# Patient Record
Sex: MALE | Race: BLACK OR AFRICAN AMERICAN | NOT HISPANIC OR LATINO | Employment: FULL TIME | URBAN - METROPOLITAN AREA
[De-identification: names, ages, dates, MRNs, and addresses within clinical notes are randomized per-mention and may not be internally consistent; named-entity substitution may affect disease eponyms.]

---

## 2017-01-18 ENCOUNTER — ALLSCRIPTS OFFICE VISIT (OUTPATIENT)
Dept: OTHER | Facility: OTHER | Age: 47
End: 2017-01-18

## 2018-01-13 VITALS
TEMPERATURE: 98.2 F | WEIGHT: 171.38 LBS | HEART RATE: 78 BPM | RESPIRATION RATE: 14 BRPM | DIASTOLIC BLOOD PRESSURE: 72 MMHG | SYSTOLIC BLOOD PRESSURE: 124 MMHG | HEIGHT: 69 IN | BODY MASS INDEX: 25.38 KG/M2

## 2018-01-13 NOTE — MISCELLANEOUS
Provider Comments  Provider Comments:   Patient was a no show for today's scheduled appointment at 3:45 PM for hiccups        Signatures   Electronically signed by : Magali Rinne, M D ; Apr  7 [id number]  5:16PM EST                       (Author)

## 2018-02-12 DIAGNOSIS — L30.9 DERMATITIS: Primary | ICD-10-CM

## 2018-02-12 RX ORDER — PREDNISONE 20 MG/1
TABLET ORAL
Qty: 11 TABLET | Refills: 0 | Status: SHIPPED | OUTPATIENT
Start: 2018-02-12 | End: 2019-09-10 | Stop reason: SDUPTHER

## 2018-02-19 DIAGNOSIS — L30.9 DERMATITIS: ICD-10-CM

## 2018-03-16 RX ORDER — PREDNISONE 20 MG/1
TABLET ORAL
Qty: 11 TABLET | Refills: 0 | OUTPATIENT
Start: 2018-03-16

## 2019-09-09 RX ORDER — ALBUTEROL SULFATE 90 UG/1
2 AEROSOL, METERED RESPIRATORY (INHALATION)
COMMUNITY
Start: 2013-07-17 | End: 2020-12-07

## 2019-09-09 RX ORDER — ALBUTEROL SULFATE 2.5 MG/3ML
2.5 SOLUTION RESPIRATORY (INHALATION) EVERY 4 HOURS PRN
COMMUNITY
Start: 2019-09-08 | End: 2020-09-07

## 2019-09-10 ENCOUNTER — OFFICE VISIT (OUTPATIENT)
Dept: FAMILY MEDICINE CLINIC | Facility: CLINIC | Age: 49
End: 2019-09-10
Payer: COMMERCIAL

## 2019-09-10 VITALS
OXYGEN SATURATION: 98 % | BODY MASS INDEX: 24.74 KG/M2 | TEMPERATURE: 97.4 F | DIASTOLIC BLOOD PRESSURE: 74 MMHG | HEART RATE: 61 BPM | SYSTOLIC BLOOD PRESSURE: 120 MMHG | WEIGHT: 172.8 LBS | RESPIRATION RATE: 18 BRPM | HEIGHT: 70 IN

## 2019-09-10 DIAGNOSIS — J45.909 UNCOMPLICATED ASTHMA, UNSPECIFIED ASTHMA SEVERITY, UNSPECIFIED WHETHER PERSISTENT: ICD-10-CM

## 2019-09-10 DIAGNOSIS — N52.9 ERECTILE DYSFUNCTION, UNSPECIFIED ERECTILE DYSFUNCTION TYPE: Primary | ICD-10-CM

## 2019-09-10 DIAGNOSIS — L30.9 DERMATITIS: ICD-10-CM

## 2019-09-10 PROCEDURE — 99214 OFFICE O/P EST MOD 30 MIN: CPT | Performed by: FAMILY MEDICINE

## 2019-09-10 PROCEDURE — 3008F BODY MASS INDEX DOCD: CPT | Performed by: FAMILY MEDICINE

## 2019-09-10 RX ORDER — PREDNISONE 20 MG/1
TABLET ORAL
Qty: 11 TABLET | Refills: 3 | Status: SHIPPED | OUTPATIENT
Start: 2019-09-10 | End: 2020-08-18 | Stop reason: ALTCHOICE

## 2019-09-10 RX ORDER — FLUTICASONE FUROATE AND VILANTEROL 200; 25 UG/1; UG/1
1 POWDER RESPIRATORY (INHALATION) DAILY
Qty: 1 INHALER | Refills: 5 | Status: SHIPPED | OUTPATIENT
Start: 2019-09-10 | End: 2021-02-07

## 2019-09-10 RX ORDER — ALBUTEROL SULFATE 90 UG/1
2 AEROSOL, METERED RESPIRATORY (INHALATION) EVERY 6 HOURS PRN
Qty: 1 INHALER | Refills: 3 | Status: SHIPPED | OUTPATIENT
Start: 2019-09-10 | End: 2020-08-18 | Stop reason: SDUPTHER

## 2019-09-10 RX ORDER — SILDENAFIL CITRATE 20 MG/1
20 TABLET ORAL 3 TIMES DAILY
Qty: 30 TABLET | Refills: 5 | Status: SHIPPED | OUTPATIENT
Start: 2019-09-10

## 2019-09-10 NOTE — ASSESSMENT & PLAN NOTE
Dermatitis  Patient given prescription for prednisone tapering dose as ordered which she has used in the past for outbreaks of dermatitis in pruritic scalp    Topical products have not been effective according to patient

## 2019-09-10 NOTE — PROGRESS NOTES
FAMILY PRACTICE OFFICE VISIT       NAME: Tiffany Victor  AGE: 51 y o  SEX: male       : 1970        MRN: [id number]    DATE: 9/10/2019  TIME: 6:35 PM    Assessment and Plan     Problem List Items Addressed This Visit        Respiratory    Uncomplicated asthma     Asthma  Patient was given prescription to initiate Breo Ellipta inhaler 200 mcg dose once daily  He was also given of Ventolin HFA inhaler for rescue situations where he may use 2 puffs q i d  P r n  Michael Koo Patient will call if symptoms persist whereby we may consider pulmonologist consultation         Relevant Medications    albuterol (2 5 mg/3 mL) 0 083 % nebulizer solution    albuterol (VENTOLIN HFA) 90 mcg/act inhaler    fluticasone-salmeterol (ADVAIR DISKUS) 250-50 mcg/dose inhaler    fluticasone-vilanterol (BREO ELLIPTA) 200-25 MCG/INH inhaler    albuterol (VENTOLIN HFA) 90 mcg/act inhaler       Musculoskeletal and Integument    Dermatitis     Dermatitis  Patient given prescription for prednisone tapering dose as ordered which she has used in the past for outbreaks of dermatitis in pruritic scalp  Topical products have not been effective according to patient         Relevant Medications    predniSONE 20 mg tablet    Other Relevant Orders    Comprehensive metabolic panel    TSH, 3rd generation    Testosterone       Genitourinary    Erectile dysfunction - Primary     Erectile dysfunction  Patient was given prescription to obtain blood work as ordered for further evaluation  He may also try sildenafil 20 mg tablets and may use 1-5 tablets as directed         Relevant Medications    sildenafil (REVATIO) 20 mg tablet    Other Relevant Orders    Comprehensive metabolic panel    TSH, 3rd generation    Testosterone              Chief Complaint     Chief Complaint   Patient presents with    Follow-up    Medication Problem       History of Present Illness     Patient complains of continued intermittent exacerbation of asthma    He had been on Advair 250 but felt that symptoms were still present and this was ineffective  He currently does not have a rescue inhaler but does have an albuterol nebulizer machine at home  He is a nonsmoker  I reviewed recent emergency room report for asthma exacerbation  Patient states he did have allergy testing a few years ago which showed he had significant allergies to several triggers  Patient also complains chronic intermittent erectile dysfunction where he has difficulties maintaining an erection  Patient requested trial of Viagra to assist with this condition  Patient also requested a refill on his prednisone which she uses periodically for eczema or pruritic rash on his skin or scalp which tends to occur for a few times a year  Review of Systems   Review of Systems   Constitutional: Negative  HENT: Negative  Respiratory: Positive for shortness of breath and wheezing  Cardiovascular: Negative  Gastrointestinal: Negative  Genitourinary:        As per hpi   Neurological: Negative  Psychiatric/Behavioral: Negative  Active Problem List     Patient Active Problem List   Diagnosis    Dermatitis    Erectile dysfunction    Uncomplicated asthma    Asthma    Eczema       Past Medical History:  No past medical history on file  Past Surgical History:  No past surgical history on file  Family History:  No family history on file      Social History:  Social History     Socioeconomic History    Marital status: /Civil Union     Spouse name: Not on file    Number of children: Not on file    Years of education: Not on file    Highest education level: Not on file   Occupational History    Not on file   Social Needs    Financial resource strain: Not on file    Food insecurity:     Worry: Not on file     Inability: Not on file    Transportation needs:     Medical: Not on file     Non-medical: Not on file   Tobacco Use    Smoking status: Never Smoker    Smokeless tobacco: Never Used   Substance and Sexual Activity    Alcohol use: Not on file    Drug use: Not on file    Sexual activity: Not on file   Lifestyle    Physical activity:     Days per week: Not on file     Minutes per session: Not on file    Stress: Not on file   Relationships    Social connections:     Talks on phone: Not on file     Gets together: Not on file     Attends Worship service: Not on file     Active member of club or organization: Not on file     Attends meetings of clubs or organizations: Not on file     Relationship status: Not on file    Intimate partner violence:     Fear of current or ex partner: Not on file     Emotionally abused: Not on file     Physically abused: Not on file     Forced sexual activity: Not on file   Other Topics Concern    Not on file   Social History Narrative    Not on file       Objective     Vitals:    09/10/19 1547   BP: 120/74   Pulse: 61   Resp: 18   Temp: (!) 97 4 °F (36 3 °C)   SpO2: 98%     Wt Readings from Last 3 Encounters:   09/10/19 78 4 kg (172 lb 12 8 oz)   01/18/17 77 7 kg (171 lb 6 1 oz)   02/16/16 76 8 kg (169 lb 6 1 oz)       Physical Exam   Constitutional: He is oriented to person, place, and time  No distress  HENT:   Right Ear: External ear normal    Left Ear: External ear normal    Mouth/Throat: Oropharynx is clear and moist    Cardiovascular: Normal rate, regular rhythm and normal heart sounds  No murmur heard  Pulmonary/Chest: Effort normal and breath sounds normal  No respiratory distress  He has no wheezes  He has no rales  Abdominal: Soft  Bowel sounds are normal  He exhibits no distension and no mass  There is no tenderness  There is no guarding  Musculoskeletal: He exhibits no edema  Lymphadenopathy:     He has no cervical adenopathy  Neurological: He is alert and oriented to person, place, and time  Psychiatric: He has a normal mood and affect   His behavior is normal  Judgment and thought content normal        Pertinent Laboratory/Diagnostic Studies:  No results found for: GLUCOSE, BUN, CREATININE, CALCIUM, NA, K, CO2, CL  No results found for: ALT, AST, GGT, ALKPHOS, BILITOT    No results found for: WBC, HGB, HCT, MCV, PLT    No results found for: TSH    No results found for: CHOL  No results found for: TRIG  No results found for: HDL  No results found for: LDLCALC  No results found for: HGBA1C    No results found for this or any previous visit  Orders Placed This Encounter   Procedures    Comprehensive metabolic panel    TSH, 3rd generation    Testosterone       ALLERGIES:  Allergies   Allergen Reactions    Salmeterol Other (See Comments)    Theophylline        Current Medications     Current Outpatient Medications   Medication Sig Dispense Refill    albuterol (2 5 mg/3 mL) 0 083 % nebulizer solution Inhale 2 5 mg every 4 (four) hours as needed      albuterol (VENTOLIN HFA) 90 mcg/act inhaler Inhale 2 puffs      fluticasone-salmeterol (ADVAIR DISKUS) 250-50 mcg/dose inhaler Take 1 puff bid      predniSONE 20 mg tablet 2 tabs X 3 days, 1 tab X 3 days, 1/2 tab X 4 days 11 tablet 3    albuterol (VENTOLIN HFA) 90 mcg/act inhaler Inhale 2 puffs every 6 (six) hours as needed for wheezing 1 Inhaler 3    fluticasone-vilanterol (BREO ELLIPTA) 200-25 MCG/INH inhaler Inhale 1 puff daily Rinse mouth after use  1 Inhaler 5    sildenafil (REVATIO) 20 mg tablet Take 1 tablet (20 mg total) by mouth 3 (three) times a day 30 tablet 5     No current facility-administered medications for this visit            Health Maintenance     Health Maintenance   Topic Date Due    DTaP,Tdap,and Td Vaccines (1 - Tdap) 03/24/1992    INFLUENZA VACCINE  07/01/2019    Depression Screening PHQ  09/10/2020    BMI: Adult  09/10/2020    Pneumococcal Vaccine: 65+ Years (1 of 2 - PCV13) 03/24/2036    Pneumococcal Vaccine: Pediatrics (0 to 5 Years) and At-Risk Patients (6 to 59 Years)  Aged Out    HEPATITIS B VACCINES  Aged Out       There is no immunization history on file for this patient      Magali Rinne, MD

## 2019-09-10 NOTE — ASSESSMENT & PLAN NOTE
Erectile dysfunction  Patient was given prescription to obtain blood work as ordered for further evaluation    He may also try sildenafil 20 mg tablets and may use 1-5 tablets as directed

## 2019-09-10 NOTE — ASSESSMENT & PLAN NOTE
Asthma  Patient was given prescription to initiate Breo Ellipta inhaler 200 mcg dose once daily  He was also given of Ventolin HFA inhaler for rescue situations where he may use 2 puffs q i d  P r n  Allan Owen   Patient will call if symptoms persist whereby we may consider pulmonologist consultation

## 2020-08-18 ENCOUNTER — OFFICE VISIT (OUTPATIENT)
Dept: FAMILY MEDICINE CLINIC | Facility: CLINIC | Age: 50
End: 2020-08-18
Payer: COMMERCIAL

## 2020-08-18 VITALS
SYSTOLIC BLOOD PRESSURE: 110 MMHG | WEIGHT: 158.6 LBS | OXYGEN SATURATION: 100 % | TEMPERATURE: 97.6 F | HEART RATE: 65 BPM | DIASTOLIC BLOOD PRESSURE: 66 MMHG | RESPIRATION RATE: 15 BRPM | BODY MASS INDEX: 22.76 KG/M2

## 2020-08-18 DIAGNOSIS — F41.9 ANXIETY: ICD-10-CM

## 2020-08-18 DIAGNOSIS — K92.0 GASTROINTESTINAL HEMORRHAGE WITH HEMATEMESIS: ICD-10-CM

## 2020-08-18 DIAGNOSIS — L30.9 DERMATITIS: Primary | ICD-10-CM

## 2020-08-18 PROBLEM — K92.2 GI BLEED: Status: ACTIVE | Noted: 2020-08-18

## 2020-08-18 PROCEDURE — 99214 OFFICE O/P EST MOD 30 MIN: CPT | Performed by: FAMILY MEDICINE

## 2020-08-18 PROCEDURE — 3725F SCREEN DEPRESSION PERFORMED: CPT | Performed by: FAMILY MEDICINE

## 2020-08-18 PROCEDURE — 1036F TOBACCO NON-USER: CPT | Performed by: FAMILY MEDICINE

## 2020-08-18 RX ORDER — HYDROXYZINE HYDROCHLORIDE 25 MG/1
25 TABLET, FILM COATED ORAL EVERY 6 HOURS PRN
Qty: 60 TABLET | Refills: 1 | Status: SHIPPED | OUTPATIENT
Start: 2020-08-18 | End: 2020-08-27

## 2020-08-18 RX ORDER — PANTOPRAZOLE SODIUM 40 MG/1
40 TABLET, DELAYED RELEASE ORAL 2 TIMES DAILY WITH MEALS
COMMUNITY
Start: 2020-07-27

## 2020-08-18 RX ORDER — METOPROLOL SUCCINATE 25 MG/1
25 TABLET, EXTENDED RELEASE ORAL DAILY
COMMUNITY
Start: 2020-08-07 | End: 2021-05-20 | Stop reason: SDUPTHER

## 2020-08-18 RX ORDER — HYDROXYZINE HYDROCHLORIDE 25 MG/1
25 TABLET, FILM COATED ORAL 3 TIMES DAILY PRN
COMMUNITY
Start: 2020-07-27 | End: 2020-08-18 | Stop reason: SDUPTHER

## 2020-08-18 RX ORDER — TRIAMCINOLONE ACETONIDE 5 MG/G
CREAM TOPICAL 2 TIMES DAILY
Qty: 30 G | Refills: 3 | Status: SHIPPED | OUTPATIENT
Start: 2020-08-18 | End: 2021-05-20 | Stop reason: SDUPTHER

## 2020-08-18 NOTE — PROGRESS NOTES
FAMILY PRACTICE OFFICE VISIT       NAME: Jose Lloyd  AGE: 53 y o  SEX: male       : 1970        MRN: [id number]    DATE: 2020  TIME: 6:53 AM    Assessment and Plan     Problem List Items Addressed This Visit        Digestive    GI bleed     GI bleed  Patient appears to have healed from his GI bleed  He will have follow-up office visit with his gastroenterologist and have repeat blood work to check CBC  Musculoskeletal and Integument    Dermatitis - Primary     Dermatitis  Patient with history of pruritic dermatitis  He was given prescription for triamcinolone cream to apply twice daily to the affected areas as needed         Relevant Medications    triamcinolone (KENALOG) 0 5 % cream       Other    Anxiety     Anxiety  Patient was given a refill on Atarax 25 mg that he was given in the hospital that he may use every 6 hours as needed for episodic anxiety         Relevant Medications    hydrOXYzine HCL (ATARAX) 25 mg tablet              Chief Complaint     Chief Complaint   Patient presents with    Annual Exam    Follow-up       History of Present Illness     Patient in the office following extensive hospital stay last month  Patient believes he food that he was allergic to which cause tend to feel sick and began vomiting  Shortly thereafter he had significant hemoptysis which caused him to go to the hospital via ambulance  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  He had significant anemia secondary to this condition  During hospital stay he had a bout of hypertension and SVT  He was subsequently treated and placed on medications as listed on his chart  He has seen his cardiologist and gastroenterologist on follow-up visits since discharge from hospital   Patient had been working at [employer] for close to 3 months and unfortunately he was not eligible for medical insurance at that time  He denies any recurrence of any bleeding of any kind  He has been somewhat fatigued but feels he may be able to return to work early next month  Patient required a letter from his family doctor be cleared for work  He is due for blood work by his gastroenterologist for follow-up      Review of Systems   Review of Systems   Constitutional: Positive for fatigue  Negative for fever  Respiratory: Negative  Cardiovascular: Negative  Gastrointestinal: Negative  Genitourinary: Negative  Musculoskeletal:        Patient has described intermittent sharp foot pain of the dorsal part of his left foot  Symptoms are intermittent since being out of the hospital   He denies any swelling or injury   Skin:        Patient complains of his intermittent pruritic dermatitis that he has experienced over the past several years  Previously he had been given prednisone for flare ups  Neurological: Negative  Psychiatric/Behavioral: Negative  Active Problem List     Patient Active Problem List   Diagnosis    Dermatitis    Erectile dysfunction    Uncomplicated asthma    Asthma    Eczema    Anxiety    GI bleed       Past Medical History:  No past medical history on file  Past Surgical History:  No past surgical history on file      Family History:  Family History   Problem Relation Age of Onset    Asthma Mother        Social History:  Social History     Socioeconomic History    Marital status: /Civil Union     Spouse name: Not on file    Number of children: Not on file    Years of education: Not on file    Highest education level: Not on file   Occupational History    Not on file   Social Needs    Financial resource strain: Not on file    Food insecurity     Worry: Not on file     Inability: Not on file   [employer] needs     Medical: Not on file     Non-medical: Not on file   Tobacco Use    Smoking status: Never Smoker    Smokeless tobacco: Never Used   Substance and Sexual Activity    Alcohol use: Yes     Comment: Occasional     Drug use: Not on file    Sexual activity: Not on file   Lifestyle    Physical activity     Days per week: Not on file     Minutes per session: Not on file    Stress: Not on file   Relationships    Social connections     Talks on phone: Not on file     Gets together: Not on file     Attends Christianity service: Not on file     Active member of club or organization: Not on file     Attends meetings of clubs or organizations: Not on file     Relationship status: Not on file    Intimate partner violence     Fear of current or ex partner: Not on file     Emotionally abused: Not on file     Physically abused: Not on file     Forced sexual activity: Not on file   Other Topics Concern    Not on file   Social History Narrative    Not on file       Objective     Vitals:    08/18/20 1343   BP: 110/66   Pulse: 65   Resp: 15   Temp: 97 6 °F (36 4 °C)   SpO2: 100%     Wt Readings from Last 3 Encounters:   08/18/20 71 9 kg (158 lb 9 6 oz)   09/10/19 78 4 kg (172 lb 12 8 oz)   01/18/17 77 7 kg (171 lb 6 1 oz)       Physical Exam  Constitutional:       General: He is not in acute distress  Appearance: Normal appearance  He is not ill-appearing  Cardiovascular:      Rate and Rhythm: Normal rate and regular rhythm  Heart sounds: Normal heart sounds  No murmur  Pulmonary:      Effort: Pulmonary effort is normal  No respiratory distress  Breath sounds: Normal breath sounds  No wheezing or rales  Abdominal:      General: Bowel sounds are normal       Palpations: Abdomen is soft  Tenderness: There is no abdominal tenderness  There is no guarding or rebound  Musculoskeletal:      Right lower leg: No edema  Left lower leg: No edema  Neurological:      General: No focal deficit present  Mental Status: He is alert and oriented to person, place, and time  Mental status is at baseline     Psychiatric:         Mood and Affect: Mood normal          Behavior: Behavior normal          Thought Content: Thought content normal          Judgment: Judgment normal          Pertinent Laboratory/Diagnostic Studies:  No results found for: GLUCOSE, BUN, CREATININE, CALCIUM, NA, K, CO2, CL  No results found for: ALT, AST, GGT, ALKPHOS, BILITOT    No results found for: WBC, HGB, HCT, MCV, PLT    No results found for: TSH    No results found for: CHOL  No results found for: TRIG  No results found for: HDL  No results found for: LDLCALC  No results found for: HGBA1C    No results found for this or any previous visit  No orders of the defined types were placed in this encounter  ALLERGIES:  Allergies   Allergen Reactions    Salmeterol Other (See Comments)    Theophylline        Current Medications     Current Outpatient Medications   Medication Sig Dispense Refill    albuterol (2 5 mg/3 mL) 0 083 % nebulizer solution Inhale 2 5 mg every 4 (four) hours as needed      albuterol (VENTOLIN HFA) 90 mcg/act inhaler Inhale 2 puffs      fluticasone-salmeterol (ADVAIR DISKUS) 250-50 mcg/dose inhaler Take 1 puff bid      fluticasone-vilanterol (BREO ELLIPTA) 200-25 MCG/INH inhaler Inhale 1 puff daily Rinse mouth after use  1 Inhaler 5    hydrOXYzine HCL (ATARAX) 25 mg tablet Take 1 tablet (25 mg total) by mouth every 6 (six) hours as needed (as needed) 60 tablet 1    metoprolol succinate (TOPROL-XL) 25 mg 24 hr tablet Take 25 mg by mouth daily      pantoprazole (PROTONIX) 40 mg tablet Take 40 mg by mouth 2 (two) times a day with meals      sildenafil (REVATIO) 20 mg tablet Take 1 tablet (20 mg total) by mouth 3 (three) times a day 30 tablet 5    triamcinolone (KENALOG) 0 5 % cream Apply topically 2 (two) times a day 30 g 3     No current facility-administered medications for this visit            Health Maintenance     Health Maintenance   Topic Date Due    Pneumococcal Vaccine: Pediatrics (0 to 5 Years) and At-Risk Patients (6 to 59 Years) (1 of 1 - PPSV23) 03/24/1977    HIV Screening  03/24/1986    Annual Physical 03/24/1989    DTaP,Tdap,and Td Vaccines (1 - Tdap) 03/24/1992    Influenza Vaccine  07/01/2020    Depression Screening PHQ  08/18/2021    BMI: Adult  08/18/2021    HIB Vaccine  Aged Out    Hepatitis B Vaccine  Aged Out    IPV Vaccine  Aged Out    Hepatitis A Vaccine  Aged Out    Meningococcal ACWY Vaccine  Aged Out    HPV Vaccine  Aged Out       There is no immunization history on file for this patient      Padmini Vazquez MD    I spent 25 minutes with this patient of which greater than 50% was spent counseling

## 2020-08-19 NOTE — ASSESSMENT & PLAN NOTE
Anxiety    Patient was given a refill on Atarax 25 mg that he was given in the hospital that he may use every 6 hours as needed for episodic anxiety

## 2020-08-19 NOTE — ASSESSMENT & PLAN NOTE
GI bleed  Patient appears to have healed from his GI bleed  He will have follow-up office visit with his gastroenterologist and have repeat blood work to check CBC

## 2020-08-19 NOTE — ASSESSMENT & PLAN NOTE
Dermatitis  Patient with history of pruritic dermatitis    He was given prescription for triamcinolone cream to apply twice daily to the affected areas as needed Integumentary status not within defined limits    • Pt's integumentary status will be adequate for discharge Not Progressing          DISCHARGE PLANNING    • Discharge to home or other facility with appropriate resources Progressing        PAIN - ADULT

## 2020-08-27 DIAGNOSIS — F41.9 ANXIETY: ICD-10-CM

## 2020-08-27 RX ORDER — HYDROXYZINE HYDROCHLORIDE 25 MG/1
25 TABLET, FILM COATED ORAL EVERY 6 HOURS PRN
Qty: 60 TABLET | Refills: 1 | Status: SHIPPED | OUTPATIENT
Start: 2020-08-27 | End: 2020-09-11

## 2020-09-10 DIAGNOSIS — F41.9 ANXIETY: ICD-10-CM

## 2020-09-11 RX ORDER — HYDROXYZINE HYDROCHLORIDE 25 MG/1
25 TABLET, FILM COATED ORAL EVERY 6 HOURS PRN
Qty: 360 TABLET | Refills: 1 | Status: SHIPPED | OUTPATIENT
Start: 2020-09-11 | End: 2021-09-23

## 2020-10-08 ENCOUNTER — OFFICE VISIT (OUTPATIENT)
Dept: FAMILY MEDICINE CLINIC | Facility: CLINIC | Age: 50
End: 2020-10-08
Payer: COMMERCIAL

## 2020-10-08 VITALS
TEMPERATURE: 98.2 F | BODY MASS INDEX: 23.19 KG/M2 | SYSTOLIC BLOOD PRESSURE: 128 MMHG | HEIGHT: 70 IN | WEIGHT: 162 LBS | OXYGEN SATURATION: 98 % | HEART RATE: 62 BPM | RESPIRATION RATE: 16 BRPM | DIASTOLIC BLOOD PRESSURE: 84 MMHG
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
## 2020-10-08 DIAGNOSIS — J45.909 MILD ASTHMA WITHOUT COMPLICATION, UNSPECIFIED WHETHER PERSISTENT: ICD-10-CM

## 2020-10-08 DIAGNOSIS — Z23 NEED FOR INFLUENZA VACCINATION: Primary | ICD-10-CM

## 2020-10-08 PROCEDURE — 90682 RIV4 VACC RECOMBINANT DNA IM: CPT

## 2020-10-08 PROCEDURE — 99214 OFFICE O/P EST MOD 30 MIN: CPT | Performed by: FAMILY MEDICINE

## 2020-10-08 PROCEDURE — 90471 IMMUNIZATION ADMIN: CPT

## 2020-10-08 RX ORDER — PREDNISONE 10 MG/1
TABLET ORAL
Qty: 15 TABLET | Refills: 0 | Status: SHIPPED | OUTPATIENT
Start: 2020-10-08 | End: 2020-10-08 | Stop reason: SDUPTHER

## 2020-10-08 RX ORDER — PREDNISONE 10 MG/1
TABLET ORAL
Qty: 15 TABLET | Refills: 0 | Status: SHIPPED | OUTPATIENT
Start: 2020-10-08 | End: 2021-02-07

## 2020-12-07 DIAGNOSIS — J45.909 MILD ASTHMA WITHOUT COMPLICATION, UNSPECIFIED WHETHER PERSISTENT: Primary | ICD-10-CM

## 2020-12-07 RX ORDER — ALBUTEROL SULFATE 90 UG/1
AEROSOL, METERED RESPIRATORY (INHALATION)
Qty: 8.5 INHALER | Refills: 3 | Status: SHIPPED | OUTPATIENT
Start: 2020-12-07 | End: 2021-11-09 | Stop reason: SDUPTHER

## 2021-02-07 ENCOUNTER — OFFICE VISIT (OUTPATIENT)
Dept: URGENT CARE | Facility: CLINIC | Age: 51
End: 2021-02-07
Payer: COMMERCIAL

## 2021-02-07 VITALS
DIASTOLIC BLOOD PRESSURE: 90 MMHG | TEMPERATURE: 97.2 F | WEIGHT: 169.6 LBS | BODY MASS INDEX: 24.28 KG/M2 | OXYGEN SATURATION: 100 % | SYSTOLIC BLOOD PRESSURE: 130 MMHG | HEIGHT: 70 IN | HEART RATE: 58 BPM | RESPIRATION RATE: 18 BRPM

## 2021-02-07 DIAGNOSIS — J45.20 MILD INTERMITTENT ASTHMA WITHOUT COMPLICATION: Primary | ICD-10-CM

## 2021-02-07 PROCEDURE — 99213 OFFICE O/P EST LOW 20 MIN: CPT | Performed by: PHYSICIAN ASSISTANT

## 2021-02-07 RX ORDER — MOMETASONE FUROATE AND FORMOTEROL FUMARATE DIHYDRATE 200; 5 UG/1; UG/1
2 AEROSOL RESPIRATORY (INHALATION) 2 TIMES DAILY
COMMUNITY
End: 2021-05-20 | Stop reason: SDUPTHER

## 2021-02-07 NOTE — LETTER
February 7, 2021     Patient: Shira Linn   YOB: 1970   Date of Visit: 2/7/2021       To Whom It May Concern: It is my medical opinion that Ron Finley may return to full duty immediately with no restrictions  If you have any questions or concerns, please don't hesitate to call           Sincerely,      Evens Jin PA-C    CC: No Recipients

## 2021-02-07 NOTE — PATIENT INSTRUCTIONS
Normal Exam   WHAT YOU NEED TO KNOW:   Your healthcare provider did not find a reason for your symptoms today  You may need to follow up with your healthcare provider or a specialist  He will work with you to try to find the cause of your symptoms  He may also run tests to find out more about your overall health  DISCHARGE INSTRUCTIONS:   Follow up with your healthcare provider or a specialist as directed:  Tell your healthcare provider about your symptoms  You may be given a complete physical exam and health checkup  Write down your questions so you remember to ask them during your visits  Maintain a healthy lifestyle:  Healthy foods and regular physical activity can improve your health  They also decrease your risk of heart disease, high blood pressure, and diabetes  · Get 30 minutes of activity every day  most days of the week  Ask your healthcare provider which activities are best for you  You can do 30 minutes at once or spread your activity throughout the day to get the recommended amount  · Eat a variety of healthy foods  Healthy foods include whole-grain breads, low-fat dairy products, beans, lean meats, and fish  Eat fruits and vegetables every day, especially those that are green, orange, and red  · Maintain a healthy weight  Ask your healthcare provider how much you should weigh  Ask him to help you create a weight loss plan if you are overweight  · Limit alcohol  Women should limit alcohol to 1 drink a day  Men should limit alcohol to 2 drinks a day  A drink of alcohol is 12 ounces of beer, 5 ounces of wine, or 1½ ounces of liquor  Do not smoke: If you smoke, it is never too late to quit  You lower your risk for many health problems if you quit  Ask your healthcare provider for information if you need help quitting  Contact your healthcare provider if:   · Your symptoms get worse, or you have new symptoms that bother you       · You have questions or concerns about your condition or care  · Your illness makes it difficult to follow a healthy diet  Return to the emergency department if:   · You have trouble breathing  · You have chest pain  · You feel lightheaded or faint  © Copyright [address] Information is for End User's use only and may not be sold, redistributed or otherwise used for commercial purposes  All illustrations and images included in CareNotes® are the copyrighted property of A D A M [employer] Inc  or [facility]   The above information is an  only  It is not intended as medical advice for individual conditions or treatments  Talk to your doctor, nurse or pharmacist before following any medical regimen to see if it is safe and effective for you  Return to work note:   -The patient had a negative COVID-19 test and is now completely asymptomatic  His examination and vitals were within normal limits  Will give him one refill of his Dulera  He is cleared to return to work  He should follow up with his PCP for further workup of his single episode of vertigo  If his sx return and are severe he should go to the ED

## 2021-02-07 NOTE — PROGRESS NOTES
[facility]        NAME: Jazmín Carlton is a 49 y o  male  : 1970    MRN: [id number]  DATE: 2021  TIME: 2:09 PM    Assessment and Plan   Mild intermittent asthma without complication [X31 41]  1  Mild intermittent asthma without complication  mometasone-formoterol (DULERA) 200-5 MCG/ACT inhaler         Patient Instructions   Return to work note:   -The patient had a negative COVID-19 test and is now completely asymptomatic  His examination and vitals were within normal limits  Will give him one refill of his Dulera  He is cleared to return to work  He should follow up with his PCP for further workup of his single episode of vertigo  If his sx return and are severe he should go to the ED  Follow up with PCP in 3-5 days  Proceed to  ER if symptoms worsen  Chief Complaint     Chief Complaint   Patient presents with    Shortness of Breath     Needs note to RTW - had vertigo and chills at work on Thursday - was sent home  Had negative COVID-129 test on Friday (results back yesterday)  Denies further vertigo or chills  No HA, body aches, N/V or diarrhea, cough or chest tightness  But notes occ  LUZ as he ran out of [employer] x 3 days  History of Present Illness       The patient is a 16-year-old male who presents today for a return to work note  The patient states that three days ago he had a transient episode of vertigo, chills and dyspnea on exertion  He was sent home from work and had a COVID-19 test  He states that his test on 21 was negative  He states that since then he has not experienced any fever, chills, myalgias, dyspnea, wheezing, chest tightness  No palpitations or cp  No headache  No loss of taste or smell  No GI sx  No sick contacts or recent travel  He states that he has a hx of asthma and has run out of his Dulera inhaler over the last 3 days  He feels this may have precipitated his episode         Review of Systems   Review of Systems   Constitutional: Negative for activity change, appetite change, chills, diaphoresis, fatigue and fever  HENT: Negative for congestion, ear discharge, ear pain, facial swelling, hearing loss, postnasal drip, rhinorrhea, sinus pressure, sinus pain, sore throat, tinnitus, trouble swallowing and voice change  Eyes: Negative for visual disturbance  Respiratory: Negative for apnea, cough, chest tightness, shortness of breath, wheezing and stridor  Cardiovascular: Negative for chest pain, palpitations and leg swelling  Gastrointestinal: Negative for abdominal distention and abdominal pain  Genitourinary: Negative for decreased urine volume  Musculoskeletal: Negative for arthralgias, joint swelling, myalgias, neck pain and neck stiffness  Skin: Negative for rash  Allergic/Immunologic: Negative for immunocompromised state  Neurological: Negative for dizziness, weakness, light-headedness, numbness and headaches  Hematological: Negative for adenopathy           Current Medications       Current Outpatient Medications:     acetaminophen (TYLENOL) 500 mg tablet, Take 500 mg by mouth every 6 (six) hours as needed, Disp: , Rfl:     albuterol (PROVENTIL HFA,VENTOLIN HFA) 90 mcg/act inhaler, INHALE 2 PUFFS BY MOUTH EVERY 6 HOURS AS NEEDED WHEEZING, Disp: 8 5 Inhaler, Rfl: 3    hydrOXYzine HCL (ATARAX) 25 mg tablet, TAKE 1 TABLET (25 MG TOTAL) BY MOUTH EVERY 6 (SIX) HOURS AS NEEDED (AS NEEDED), Disp: 360 tablet, Rfl: 1    metoprolol succinate (TOPROL-XL) 25 mg 24 hr tablet, Take 25 mg by mouth daily, Disp: , Rfl:     mometasone-formoterol (Dulera) 200-5 MCG/ACT inhaler, Inhale 2 puffs 2 (two) times a day Rinse mouth after use , Disp: , Rfl:     Multiple Vitamins-Minerals (MULTIVITAMIN GUMMIES MENS PO), Take by mouth daily, Disp: , Rfl:     pantoprazole (PROTONIX) 40 mg tablet, Take 40 mg by mouth 2 (two) times a day with meals, Disp: , Rfl:     sildenafil (REVATIO) 20 mg tablet, Take 1 tablet (20 mg total) by mouth 3 (three) times a day, Disp: 30 tablet, Rfl: 5    triamcinolone (KENALOG) 0 5 % cream, Apply topically 2 (two) times a day, Disp: 30 g, Rfl: 3    fluticasone-salmeterol (ADVAIR DISKUS) 250-50 mcg/dose inhaler, Take 1 puff bid, Disp: , Rfl:     mometasone-formoterol (DULERA) 200-5 MCG/ACT inhaler, Inhale 2 puffs 2 (two) times a day Rinse mouth after use , Disp: 1 Inhaler, Rfl: 0    Current Allergies     Allergies as of 02/07/2021 - Reviewed 02/07/2021   Allergen Reaction Noted    Aminophylline Rash and Throat Swelling 07/24/2020    Theophylline Rash and Throat Swelling 04/06/2016    Pollen extract  07/20/2020    Salmeterol Other (See Comments) 01/15/2017    Iodine - food allergy Rash 07/24/2020    Penicillins Rash 07/24/2020    Shellfish-derived products - food allergy Rash 07/24/2020            The following portions of the patient's history were reviewed and updated as appropriate: allergies, current medications, past family history, past medical history, past social history, past surgical history and problem list      Past Medical History:   Diagnosis Date    Allergic rhinitis     Anxiety     Asthma     Eczema     GERD (gastroesophageal reflux disease)     Peptic ulceration     esophageal bleed    Pneumonia     Tachycardia        Past Surgical History:   Procedure Laterality Date    WISDOM TOOTH EXTRACTION         Family History   Problem Relation Age of Onset    Asthma Mother          Medications have been verified  Objective   /90   Pulse 58   Temp (!) 97 2 °F (36 2 °C)   Resp 18   Ht 5' 9 5" (1 765 m)   Wt 76 9 kg (169 lb 9 6 oz)   SpO2 100%   BMI 24 69 kg/m²   No LMP for male patient  Physical Exam     Physical Exam  Vitals signs and nursing note reviewed  Constitutional:       General: He is not in acute distress  Appearance: He is well-developed  He is not diaphoretic  HENT:      Head: Normocephalic and atraumatic        Right Ear: Hearing, tympanic membrane, ear canal and external ear normal       Left Ear: Hearing, tympanic membrane, ear canal and external ear normal       Nose: Nose normal  No mucosal edema or rhinorrhea  Right Sinus: No maxillary sinus tenderness or frontal sinus tenderness  Left Sinus: No maxillary sinus tenderness or frontal sinus tenderness  Mouth/Throat:      Pharynx: Uvula midline  No oropharyngeal exudate, posterior oropharyngeal erythema or uvula swelling  Tonsils: No tonsillar abscesses  Neck:      Musculoskeletal: Normal range of motion and neck supple  Cardiovascular:      Rate and Rhythm: Normal rate and regular rhythm  Heart sounds: S1 normal and S2 normal  Heart sounds not distant  No murmur  No friction rub  No gallop  No S3 or S4 sounds  Pulmonary:      Effort: No tachypnea, bradypnea, accessory muscle usage or respiratory distress  Breath sounds: Normal breath sounds and air entry  No stridor, decreased air movement or transmitted upper airway sounds  No decreased breath sounds, wheezing, rhonchi or rales  Lymphadenopathy:      Cervical: No cervical adenopathy  Neurological:      Mental Status: He is alert and oriented to person, place, and time     Psychiatric:         Behavior: Behavior normal

## 2021-05-20 ENCOUNTER — OFFICE VISIT (OUTPATIENT)
Dept: FAMILY MEDICINE CLINIC | Facility: CLINIC | Age: 51
End: 2021-05-20
Payer: COMMERCIAL

## 2021-05-20 VITALS
BODY MASS INDEX: 23.77 KG/M2 | SYSTOLIC BLOOD PRESSURE: 124 MMHG | HEIGHT: 70 IN | DIASTOLIC BLOOD PRESSURE: 80 MMHG | HEART RATE: 76 BPM | TEMPERATURE: 98.4 F | WEIGHT: 166 LBS

## 2021-05-20 DIAGNOSIS — D50.9 IRON DEFICIENCY ANEMIA, UNSPECIFIED IRON DEFICIENCY ANEMIA TYPE: ICD-10-CM

## 2021-05-20 DIAGNOSIS — K92.0 GASTROINTESTINAL HEMORRHAGE WITH HEMATEMESIS: ICD-10-CM

## 2021-05-20 DIAGNOSIS — K92.0 GASTROINTESTINAL HEMORRHAGE WITH HEMATEMESIS: Primary | ICD-10-CM

## 2021-05-20 DIAGNOSIS — L30.9 DERMATITIS: ICD-10-CM

## 2021-05-20 DIAGNOSIS — J45.909 MILD ASTHMA WITHOUT COMPLICATION, UNSPECIFIED WHETHER PERSISTENT: ICD-10-CM

## 2021-05-20 DIAGNOSIS — L30.9 ECZEMA, UNSPECIFIED TYPE: ICD-10-CM

## 2021-05-20 DIAGNOSIS — N52.9 ERECTILE DYSFUNCTION, UNSPECIFIED ERECTILE DYSFUNCTION TYPE: ICD-10-CM

## 2021-05-20 DIAGNOSIS — F41.9 ANXIETY: ICD-10-CM

## 2021-05-20 DIAGNOSIS — K40.90 LEFT INGUINAL HERNIA: ICD-10-CM

## 2021-05-20 PROCEDURE — 1036F TOBACCO NON-USER: CPT | Performed by: PHYSICIAN ASSISTANT

## 2021-05-20 PROCEDURE — 99204 OFFICE O/P NEW MOD 45 MIN: CPT | Performed by: PHYSICIAN ASSISTANT

## 2021-05-20 PROCEDURE — 3008F BODY MASS INDEX DOCD: CPT | Performed by: PHYSICIAN ASSISTANT

## 2021-05-20 RX ORDER — TRIAMCINOLONE ACETONIDE 5 MG/G
CREAM TOPICAL 2 TIMES DAILY
Qty: 30 G | Refills: 3 | Status: SHIPPED | OUTPATIENT
Start: 2021-05-20

## 2021-05-20 RX ORDER — MOMETASONE FUROATE AND FORMOTEROL FUMARATE DIHYDRATE 200; 5 UG/1; UG/1
2 AEROSOL RESPIRATORY (INHALATION) 2 TIMES DAILY
Qty: 13 G | Refills: 5 | Status: SHIPPED | OUTPATIENT
Start: 2021-05-20 | End: 2021-05-21

## 2021-05-20 RX ORDER — METOPROLOL SUCCINATE 25 MG/1
25 TABLET, EXTENDED RELEASE ORAL DAILY
Qty: 30 TABLET | Refills: 5 | Status: SHIPPED | OUTPATIENT
Start: 2021-05-20 | End: 2021-11-09 | Stop reason: SDUPTHER

## 2021-05-20 NOTE — TELEPHONE ENCOUNTER
I checked the Formulary and there are NO alternatives that don't require a PA   If this is the medication you prefer he be on we will need to do the Auth

## 2021-05-20 NOTE — TELEPHONE ENCOUNTER
Is pharmacist able to see if there is any covered alternatives such as Advair, AirDuo, or Symbicort?

## 2021-05-20 NOTE — PROGRESS NOTES
Assessment and Plan:  Patient Instructions   Assessment/plan:  1  Left inguinal hernia-patient does appear to have sizable hernia of the left inguinal area  Would recommend evaluation by General surgery, Dr Sarah Rowe  2  Moderate persistent asthma-presently stable with Dulera inhaler twice daily  Patient may use albuterol as necessary but his symptoms have been quite well controlled with Dulera alone  3  Mild anxiety-presently stable with metoprolol 25 mg daily and hydroxyzine 25 mg as necessary  4   Eczema-stable with triamcinolone cream as necessary  5  GI bleed-patient had significant bleed last summer  His hemoglobin had been coming up but we need to reassess  He has been able to reduce Protonix to once daily and may consider further reducing every other day  Health maintenance-screening for prostate cancer recommended with PSA  Patient states he did have colonoscopy last summer when he had GI bleed          Problem List Items Addressed This Visit        Digestive    GI bleed - Primary    Relevant Medications    metoprolol succinate (TOPROL-XL) 25 mg 24 hr tablet    mometasone-formoterol (Dulera) 200-5 MCG/ACT inhaler    Other Relevant Orders    CBC and differential    Comprehensive metabolic panel    Lipid Panel with Direct LDL reflex    PSA, Total Screen    TSH, 3rd generation with Free T4 reflex    Ferritin       Respiratory    Asthma    Relevant Medications    metoprolol succinate (TOPROL-XL) 25 mg 24 hr tablet    mometasone-formoterol (Dulera) 200-5 MCG/ACT inhaler    Other Relevant Orders    CBC and differential    Comprehensive metabolic panel    Lipid Panel with Direct LDL reflex    PSA, Total Screen    TSH, 3rd generation with Free T4 reflex    Ferritin       Musculoskeletal and Integument    Dermatitis    Relevant Medications    metoprolol succinate (TOPROL-XL) 25 mg 24 hr tablet    mometasone-formoterol (Dulera) 200-5 MCG/ACT inhaler    triamcinolone (KENALOG) 0 5 % cream    Other Relevant Orders    CBC and differential    Comprehensive metabolic panel    Lipid Panel with Direct LDL reflex    PSA, Total Screen    TSH, 3rd generation with Free T4 reflex    Ferritin    Eczema    Relevant Medications    triamcinolone (KENALOG) 0 5 % cream       Other    Erectile dysfunction    Anxiety    Relevant Medications    metoprolol succinate (TOPROL-XL) 25 mg 24 hr tablet    mometasone-formoterol (Dulera) 200-5 MCG/ACT inhaler    Other Relevant Orders    CBC and differential    Comprehensive metabolic panel    Lipid Panel with Direct LDL reflex    PSA, Total Screen    TSH, 3rd generation with Free T4 reflex    Ferritin      Other Visit Diagnoses     Iron deficiency anemia, unspecified iron deficiency anemia type        Relevant Medications    metoprolol succinate (TOPROL-XL) 25 mg 24 hr tablet    mometasone-formoterol (Dulera) 200-5 MCG/ACT inhaler    Other Relevant Orders    CBC and differential    Comprehensive metabolic panel    Lipid Panel with Direct LDL reflex    PSA, Total Screen    TSH, 3rd generation with Free T4 reflex    Ferritin    Left inguinal hernia        Relevant Orders    Ambulatory referral to General Surgery                 Diagnoses and all orders for this visit:    Gastrointestinal hemorrhage with hematemesis  -     CBC and differential  -     Comprehensive metabolic panel  -     Lipid Panel with Direct LDL reflex  -     PSA, Total Screen  -     TSH, 3rd generation with Free T4 reflex  -     Ferritin  -     metoprolol succinate (TOPROL-XL) 25 mg 24 hr tablet; Take 1 tablet (25 mg total) by mouth daily  -     mometasone-formoterol (Dulera) 200-5 MCG/ACT inhaler; Inhale 2 puffs 2 (two) times a day Rinse mouth after use      Iron deficiency anemia, unspecified iron deficiency anemia type  -     CBC and differential  -     Comprehensive metabolic panel  -     Lipid Panel with Direct LDL reflex  -     PSA, Total Screen  -     TSH, 3rd generation with Free T4 reflex  -     Ferritin  -     metoprolol succinate (TOPROL-XL) 25 mg 24 hr tablet; Take 1 tablet (25 mg total) by mouth daily  -     mometasone-formoterol (Dulera) 200-5 MCG/ACT inhaler; Inhale 2 puffs 2 (two) times a day Rinse mouth after use  Anxiety  -     CBC and differential  -     Comprehensive metabolic panel  -     Lipid Panel with Direct LDL reflex  -     PSA, Total Screen  -     TSH, 3rd generation with Free T4 reflex  -     Ferritin  -     metoprolol succinate (TOPROL-XL) 25 mg 24 hr tablet; Take 1 tablet (25 mg total) by mouth daily  -     mometasone-formoterol (Dulera) 200-5 MCG/ACT inhaler; Inhale 2 puffs 2 (two) times a day Rinse mouth after use  Mild asthma without complication, unspecified whether persistent  -     CBC and differential  -     Comprehensive metabolic panel  -     Lipid Panel with Direct LDL reflex  -     PSA, Total Screen  -     TSH, 3rd generation with Free T4 reflex  -     Ferritin  -     metoprolol succinate (TOPROL-XL) 25 mg 24 hr tablet; Take 1 tablet (25 mg total) by mouth daily  -     mometasone-formoterol (Dulera) 200-5 MCG/ACT inhaler; Inhale 2 puffs 2 (two) times a day Rinse mouth after use  Dermatitis  -     CBC and differential  -     Comprehensive metabolic panel  -     Lipid Panel with Direct LDL reflex  -     PSA, Total Screen  -     TSH, 3rd generation with Free T4 reflex  -     Ferritin  -     metoprolol succinate (TOPROL-XL) 25 mg 24 hr tablet; Take 1 tablet (25 mg total) by mouth daily  -     mometasone-formoterol (Dulera) 200-5 MCG/ACT inhaler; Inhale 2 puffs 2 (two) times a day Rinse mouth after use  -     triamcinolone (KENALOG) 0 5 % cream; Apply topically 2 (two) times a day    Eczema, unspecified type    Erectile dysfunction, unspecified erectile dysfunction type    Left inguinal hernia  -     Ambulatory referral to General Surgery; Future              Subjective:      Patient ID: Desiree Bolden is a 49 y o  male      CC:    Chief Complaint   Patient presents with   [address] patient here to establish care  Pt states he has a diagnosed hernia and is requesting referral to have it repaired  -  [facility]       HPI:    HPI:  This is a 52-year-old gentleman that presents to the office as a new patient  He is here to establish and discuss chronic health conditions  It has been sometime since he has had blood work completed  He did have a significant hospitalization about a year ago in which she had a severe GI bleed and needed to be transfused  This was from a tear in the esophagus  He has continued Protonix twice daily initially, now once daily and has been doing well without any recurrent symptoms  His blood test showed that his hemoglobin had been coming back up but it has been sometime since it was repeated  He also has a history of moderate persistent asthma and uses Dulera inhaler twice daily  He also uses albuterol when necessary but as long as he is on the [facility] he seems to be pretty well controlled and not needing his short-acting inhaler often  He has eczema for which he uses steroid cream as necessary  He also has a history of mild anxiety which has been stable with metoprolol low dose, and hydroxyzine as necessary  He does complain today that he has hernia in the left inguinal area and would like to discuss getting it fixed with a surgeon  It has not been causing much pain but sometimes when he is lifting it bothers  He does stay very physically active on a regular basis and does weights as well as cardiovascular exercise  The following portions of the patient's history were reviewed and updated as appropriate: allergies, current medications, past family history, past medical history, past social history, past surgical history and problem list       Review of Systems   Constitutional: Negative for chills, fatigue and fever  HENT: Negative for congestion, ear pain and sinus pressure  Eyes: Negative for visual disturbance     Respiratory: Negative for cough, chest tightness and shortness of breath  Cardiovascular: Negative for chest pain and palpitations  Gastrointestinal: Negative for diarrhea, nausea and vomiting  Endocrine: Negative for polyuria  Genitourinary: Negative for dysuria and frequency  Musculoskeletal: Negative for arthralgias and myalgias  Skin: Negative for pallor and rash  Neurological: Negative for dizziness, weakness, light-headedness, numbness and headaches  Psychiatric/Behavioral: Negative for agitation, behavioral problems and sleep disturbance  All other systems reviewed and are negative  Data to review:       Objective:    Vitals:    05/20/21 0815   BP: 124/80   BP Location: Left arm   Patient Position: Sitting   Cuff Size: Large   Pulse: 76   Temp: 98 4 °F (36 9 °C)   TempSrc: Oral   Weight: 75 3 kg (166 lb)   Height: 5' 9 5" (1 765 m)        Physical Exam  Constitutional:       General: He is not in acute distress  Appearance: He is well-developed  HENT:      Head: Normocephalic and atraumatic  Right Ear: Tympanic membrane normal       Left Ear: Tympanic membrane normal    Eyes:      Conjunctiva/sclera: Conjunctivae normal    Neck:      Musculoskeletal: Normal range of motion  Cardiovascular:      Rate and Rhythm: Normal rate and regular rhythm  Pulmonary:      Effort: Pulmonary effort is normal    Abdominal:      General: Abdomen is flat  Bowel sounds are normal  There is no distension  Palpations: Abdomen is soft  There is no mass  Genitourinary:     Comments: Patient does appear to have left direct inguinal hernia present  There is no sign of strangulation or incarceration  No tenderness  Reducible  Musculoskeletal: Normal range of motion  Skin:     General: Skin is warm  Findings: No rash  Neurological:      Mental Status: He is alert and oriented to person, place, and time     Psychiatric:         Mood and Affect: Mood normal

## 2021-05-20 NOTE — PATIENT INSTRUCTIONS
Assessment/plan:  1  Left inguinal hernia-patient does appear to have sizable hernia of the left inguinal area  Would recommend evaluation by General surgery, Dr Imelda Alba  2  Moderate persistent asthma-presently stable with Dulera inhaler twice daily  Patient may use albuterol as necessary but his symptoms have been quite well controlled with Dulera alone  3  Mild anxiety-presently stable with metoprolol 25 mg daily and hydroxyzine 25 mg as necessary  4   Eczema-stable with triamcinolone cream as necessary  5  GI bleed-patient had significant bleed last summer  His hemoglobin had been coming up but we need to reassess  He has been able to reduce Protonix to once daily and may consider further reducing every other day  Health maintenance-screening for prostate cancer recommended with PSA  Patient states he did have colonoscopy last summer when he had GI bleed

## 2021-05-21 DIAGNOSIS — L30.9 DERMATITIS: ICD-10-CM

## 2021-05-21 DIAGNOSIS — D50.9 IRON DEFICIENCY ANEMIA, UNSPECIFIED IRON DEFICIENCY ANEMIA TYPE: ICD-10-CM

## 2021-05-21 DIAGNOSIS — K92.0 GASTROINTESTINAL HEMORRHAGE WITH HEMATEMESIS: ICD-10-CM

## 2021-05-21 DIAGNOSIS — J45.909 MILD ASTHMA WITHOUT COMPLICATION, UNSPECIFIED WHETHER PERSISTENT: ICD-10-CM

## 2021-05-21 DIAGNOSIS — F41.9 ANXIETY: ICD-10-CM

## 2021-05-21 RX ORDER — MOMETASONE FUROATE AND FORMOTEROL FUMARATE DIHYDRATE 200; 5 UG/1; UG/1
2 AEROSOL RESPIRATORY (INHALATION) 2 TIMES DAILY
Qty: 13 G | Refills: 5 | Status: SHIPPED | OUTPATIENT
Start: 2021-05-21 | End: 2021-06-09

## 2021-05-21 RX ORDER — MOMETASONE FUROATE AND FORMOTEROL FUMARATE DIHYDRATE 200; 5 UG/1; UG/1
2 AEROSOL RESPIRATORY (INHALATION) 2 TIMES DAILY
Qty: 13 G | Refills: 5 | Status: SHIPPED | OUTPATIENT
Start: 2021-05-21 | End: 2021-05-21

## 2021-05-21 NOTE — TELEPHONE ENCOUNTER
Yes, this medication has worked best for him in the past   He has tried Advair previously without as much success  His asthma is very well controlled requiring little use of rescue inhaler with it  Please try prior auth with insurance if possible

## 2021-06-09 ENCOUNTER — TELEPHONE (OUTPATIENT)
Dept: FAMILY MEDICINE CLINIC | Facility: CLINIC | Age: 51
End: 2021-06-09

## 2021-06-09 DIAGNOSIS — J45.909 MILD ASTHMA WITHOUT COMPLICATION, UNSPECIFIED WHETHER PERSISTENT: Primary | ICD-10-CM

## 2021-06-09 RX ORDER — BUDESONIDE AND FORMOTEROL FUMARATE DIHYDRATE 160; 4.5 UG/1; UG/1
2 AEROSOL RESPIRATORY (INHALATION) 2 TIMES DAILY
Qty: 10.2 G | Refills: 5 | Status: SHIPPED | OUTPATIENT
Start: 2021-06-09 | End: 2021-08-09 | Stop reason: ALTCHOICE

## 2021-06-09 NOTE — TELEPHONE ENCOUNTER
MS, I spoke to [employer] and he states that Symbicort is a covered alternative for pts Dulera which is not covered by insurance without a P  A

## 2021-06-09 NOTE — TELEPHONE ENCOUNTER
Please notify patient [facility] [place] [facility] and [facility] new prescription to the pharmacy  This is taken the same way, 2 puffs twice daily

## 2021-06-22 ENCOUNTER — CONSULT (OUTPATIENT)
Dept: SURGERY | Facility: CLINIC | Age: 51
End: 2021-06-22
Payer: COMMERCIAL

## 2021-06-22 VITALS
HEIGHT: 70 IN | WEIGHT: 174 LBS | BODY MASS INDEX: 24.91 KG/M2 | DIASTOLIC BLOOD PRESSURE: 92 MMHG | SYSTOLIC BLOOD PRESSURE: 149 MMHG

## 2021-06-22 DIAGNOSIS — K40.90 LEFT INGUINAL HERNIA: ICD-10-CM

## 2021-06-22 PROCEDURE — 3008F BODY MASS INDEX DOCD: CPT | Performed by: SURGERY

## 2021-06-22 PROCEDURE — 1036F TOBACCO NON-USER: CPT | Performed by: SURGERY

## 2021-06-22 PROCEDURE — 99213 OFFICE O/P EST LOW 20 MIN: CPT | Performed by: SURGERY

## 2021-06-22 RX ORDER — CEFAZOLIN SODIUM 2 G/50ML
2000 SOLUTION INTRAVENOUS ONCE
Status: CANCELLED | OUTPATIENT
Start: 2021-07-22 | End: 2021-07-15

## 2021-06-22 RX ORDER — SODIUM CHLORIDE, SODIUM LACTATE, POTASSIUM CHLORIDE, CALCIUM CHLORIDE 600; 310; 30; 20 MG/100ML; MG/100ML; MG/100ML; MG/100ML
125 INJECTION, SOLUTION INTRAVENOUS CONTINUOUS
Status: CANCELLED | OUTPATIENT
Start: 2021-07-22

## 2021-06-22 NOTE — ASSESSMENT & PLAN NOTE
He has a large left inguinal scrotal hernia  Will plan on a robotic repair of his left inguinal hernia at [facility]   The risks benefits alternatives explained to is agreeable to proceed  Specifically due to the size of the hernia he will likely have a postoperative seroma which will take time to resolve  In addition is always small chance he may need a counter incision to help with reduction of the hernia sac

## 2021-06-22 NOTE — PROGRESS NOTES
Assessment/Plan:    Left inguinal hernia   He has a large left inguinal scrotal hernia  Will plan on a robotic repair of his left inguinal hernia at [facility]   The risks benefits alternatives explained to is agreeable to proceed  Specifically due to the size of the hernia he will likely have a postoperative seroma which will take time to resolve  In addition is always small chance he may need a counter incision to help with reduction of the hernia sac  Diagnoses and all orders for this visit:    Left inguinal hernia  -     Ambulatory referral to General Surgery  -     Case request operating room: REPAIR HERNIA INGUINAL LAPAROSCOPIC W/ ROBOTICS; Standing  -     Case request operating room: [address] W/ ROBOTICS    Other orders  -     Diet NPO; Sips with meds; Standing  -     Apply Sequential Compression Device; Standing  -     Place sequential compression device; Standing  -     Reason for no Mechanical VTE Prophylaxis; Standing  -     lactated ringers infusion  -     ceFAZolin (ANCEF) 2,000 mg in dextrose 5 % 100 mL IVPB          Subjective:      Patient ID: Shira Linn is a 49 y o  male  Mr Lora Pichardo  Is a 71-year-old gentleman that is here for evaluation of a left inguinal hernia  Patient states he has had hernia for years but has been smaller need more easily reducible  However last while it has been increasing  In size and is not reducible any longer  Causing more discomfort especially with lifting and working out  He does have some discomfort in his abdomen sometimes as well  He denies nausea vomiting fevers or chills  The following portions of the patient's history were reviewed and updated as appropriate: allergies, current medications, past family history, past medical history, past social history, past surgical history and problem list     Review of Systems   Constitutional: Negative for chills and fever     HENT: Negative for ear pain and sore throat  Eyes: Negative for pain and visual disturbance  Respiratory: Negative for cough and shortness of breath  Cardiovascular: Negative for chest pain and palpitations  Gastrointestinal: Positive for abdominal pain  Negative for vomiting  Genitourinary: Negative for dysuria and hematuria  Musculoskeletal: Negative for arthralgias and back pain  Skin: Negative for color change and rash  Neurological: Negative for seizures and syncope  Psychiatric/Behavioral: Negative for agitation and behavioral problems  All other systems reviewed and are negative  Objective:      /92   Ht 5' 9 5" (1 765 m)   Wt 78 9 kg (174 lb)   BMI 25 33 kg/m²          Physical Exam  Vitals and nursing note reviewed  Constitutional:       General: He is not in acute distress  Appearance: He is well-developed  He is not diaphoretic  HENT:      Head: Normocephalic and atraumatic  Eyes:      Pupils: Pupils are equal, round, and reactive to light  Cardiovascular:      Rate and Rhythm: Normal rate and regular rhythm  Heart sounds: Normal heart sounds  No murmur heard  Pulmonary:      Effort: Pulmonary effort is normal  No respiratory distress  Breath sounds: Normal breath sounds  No wheezing  Abdominal:      General: Bowel sounds are normal       Palpations: Abdomen is soft  Comments:   Large partially reducible scrotal inguinal hernia on the left   Musculoskeletal:         General: Normal range of motion  Cervical back: Normal range of motion and neck supple  Skin:     General: Skin is warm and dry  Neurological:      Mental Status: He is alert and oriented to person, place, and time     Psychiatric:         Behavior: Behavior normal

## 2021-07-13 NOTE — PRE-PROCEDURE INSTRUCTIONS
Pre-Surgery Instructions:   Medication Instructions    acetaminophen (TYLENOL) 500 mg tablet May continue to use up to and including DOS- if needed DOS take with sip of water    albuterol (PROVENTIL HFA,VENTOLIN HFA) 90 mcg/act inhaler May use up to and including DOS if needed     budesonide-formoterol (SYMBICORT) 160-4 5 mcg/act inhaler Use morning of surgery     hydrOXYzine HCL (ATARAX) 25 mg tablet May use morning of surgery if needed with sip of water    metoprolol succinate (TOPROL-XL) 25 mg 24 hr tablet Take morning of surgery     Multiple Vitamins-Minerals (MULTIVITAMIN GUMMIES MENS PO) HOLD 7 days prior to surgery    pantoprazole (PROTONIX) 40 mg tablet Take morning of surgery with sip of water    sildenafil (REVATIO) 20 mg tablet HOLD 24-48 hrs prior to surgery as per anes guidelines    triamcinolone (KENALOG) 0 5 % cream HOLD morning of surgery     Reviewed all medications and instructions for DOS  Reviewed all showering instructions and COVID visitation policy  Pt aware that [facility] is location for DOS, instructed that pt pre op nurse will call on 7/21/21 to give specific instructions for DOS  Pt instructed to bring photo ID and insurance card for DOS, remove all jewelry and  NO valuables for DOS  Pt instructed to use only Tylenol 7 days prior to DOS, NO NSAID products  Pt informed transport is needed for DOS due to receiving anesthesia  Instructed patient to minimize alcohol use prior to surgery  No alcohol 24 hours prior to surgery to reduce risk of dehydration  Pt verbalized understanding of all instructions given and reviewed for DOS  My Surgical Experience    The following information was developed to assist you to prepare for your operation  What do I need to do before coming to the hospital?   Arrange for a responsible person to drive you to and from the hospital    Arrange care for your children at home   Children are not allowed in the recovery areas of the hospital [facility] to wear clothing that is easy to put on and take off  If you are having shoulder surgery, wear a shirt that buttons or zippers in the front  Bathing  o Shower the evening before and the morning of your surgery with an antibacterial soap  Please refer to the Pre Op Showering Instructions for Surgery Patients Sheet   o Remove nail polish and all body piercing jewelry  o Do not shave any body part for at least 24 hours before surgery-this includes face, arms, legs and upper body  Food  o Nothing to eat or drink after midnight the night before your surgery  This includes candy and chewing gum  o Exception: If your surgery is after 12:00pm (noon), you may have clear liquids such as 7-Up®, ginger ale, apple or cranberry juice, Jell-O®, water, or clear broth until 8:00 am  o Do not drink milk or juice with pulp on the morning before surgery  o Do not drink alcohol 24 hours before surgery  Medicine  o Follow instructions you received from your surgeon about which medicines you may take on the day of surgery  o If instructed to take medicine on the morning of surgery, take pills with just a small sip of water  Call your prescribing doctor for specific infroamtion on what to do if you take insulin    What should I bring to the hospital?    Bring:  Yi Jacqueline or a walker, if you have them, for foot or knee surgery   A list of the daily medicines, vitamins, minerals, herbals and nutritional supplements you take  Include the dosages of medicines and the time you take them each day   Glasses, dentures or hearing aids   Minimal clothing; you will be wearing hospital sleepwear   Photo ID; required to verify your identity   If you have a Living Will or Power of , bring a copy of the documents   If you have an ostomy, bring an extra pouch and any supplies you use    Do not bring   Medicines or inhalers   Money, valuables or jewelry    What other information should I know about the day of surgery?    Notify your surgeons if you develop a cold, sore throat, cough, fever, rash or any other illness   Report to the Ambulatory Surgical/Same Day Surgery Unit   You will be instructed to stop at Registration only if you have not been pre-registered   Inform your  fi they do not stay that they will be asked by the staff to leave a phone number where they can be reached   Be available to be reached before surgery  In the event the operating room schedule changes, you may be asked to come in earlier or later than expected    *It is important to tell your doctor and others involved in your health care if you are taking or have been taking any non-prescription drugs, vitamins, minerals, herbals or other nutritional supplements   Any of these may interact with some food or medicines and cause a reaction

## 2021-07-14 ENCOUNTER — ANESTHESIA EVENT (OUTPATIENT)
Dept: PERIOP | Facility: HOSPITAL | Age: 51
End: 2021-07-14
Payer: COMMERCIAL

## 2021-07-22 ENCOUNTER — ANESTHESIA (OUTPATIENT)
Dept: PERIOP | Facility: HOSPITAL | Age: 51
End: 2021-07-22
Payer: COMMERCIAL

## 2021-07-22 ENCOUNTER — HOSPITAL ENCOUNTER (OUTPATIENT)
Facility: HOSPITAL | Age: 51
Setting detail: OUTPATIENT SURGERY
Discharge: HOME | End: 2021-07-22
Attending: SURGERY | Admitting: SURGERY
Payer: COMMERCIAL

## 2021-07-22 VITALS
WEIGHT: 174 LBS | DIASTOLIC BLOOD PRESSURE: 78 MMHG | RESPIRATION RATE: 16 BRPM | OXYGEN SATURATION: 95 % | HEIGHT: 69 IN | TEMPERATURE: 97.9 F | SYSTOLIC BLOOD PRESSURE: 134 MMHG | HEART RATE: 70 BPM | BODY MASS INDEX: 25.77 KG/M2

## 2021-07-22 DIAGNOSIS — K40.90 LEFT INGUINAL HERNIA: ICD-10-CM

## 2021-07-22 PROCEDURE — NC001 PR NO CHARGE: Performed by: SURGERY

## 2021-07-22 PROCEDURE — 49650 LAP ING HERNIA REPAIR INIT: CPT | Performed by: PHYSICIAN ASSISTANT

## 2021-07-22 PROCEDURE — 49650 LAP ING HERNIA REPAIR INIT: CPT | Performed by: SURGERY

## 2021-07-22 PROCEDURE — 88305 TISSUE EXAM BY PATHOLOGIST: CPT | Performed by: PATHOLOGY

## 2021-07-22 PROCEDURE — C1781 MESH (IMPLANTABLE): HCPCS | Performed by: SURGERY

## 2021-07-22 DEVICE — LAPAROSCOPIC SELF-FIXATING MESH, LEFT ANATOMICAL
Type: IMPLANTABLE DEVICE | Site: INGUINAL | Status: FUNCTIONAL
Brand: PROGRIP

## 2021-07-22 RX ORDER — BUPIVACAINE HYDROCHLORIDE 5 MG/ML
INJECTION, SOLUTION PERINEURAL AS NEEDED
Status: DISCONTINUED | OUTPATIENT
Start: 2021-07-22 | End: 2021-07-22 | Stop reason: HOSPADM

## 2021-07-22 RX ORDER — OXYCODONE HYDROCHLORIDE 5 MG/1
5 TABLET ORAL EVERY 4 HOURS PRN
Qty: 20 TABLET | Refills: 0 | Status: SHIPPED | OUTPATIENT
Start: 2021-07-22 | End: 2021-08-01

## 2021-07-22 RX ORDER — KETOROLAC TROMETHAMINE 30 MG/ML
INJECTION, SOLUTION INTRAMUSCULAR; INTRAVENOUS AS NEEDED
Status: DISCONTINUED | OUTPATIENT
Start: 2021-07-22 | End: 2021-07-22

## 2021-07-22 RX ORDER — FENTANYL CITRATE/PF 50 MCG/ML
25 SYRINGE (ML) INJECTION
Status: DISCONTINUED | OUTPATIENT
Start: 2021-07-22 | End: 2021-07-22 | Stop reason: HOSPADM

## 2021-07-22 RX ORDER — LIDOCAINE HYDROCHLORIDE 10 MG/ML
INJECTION, SOLUTION EPIDURAL; INFILTRATION; INTRACAUDAL; PERINEURAL AS NEEDED
Status: DISCONTINUED | OUTPATIENT
Start: 2021-07-22 | End: 2021-07-22

## 2021-07-22 RX ORDER — SODIUM CHLORIDE, SODIUM LACTATE, POTASSIUM CHLORIDE, CALCIUM CHLORIDE 600; 310; 30; 20 MG/100ML; MG/100ML; MG/100ML; MG/100ML
125 INJECTION, SOLUTION INTRAVENOUS CONTINUOUS
Status: DISCONTINUED | OUTPATIENT
Start: 2021-07-22 | End: 2021-07-22 | Stop reason: HOSPADM

## 2021-07-22 RX ORDER — CEFAZOLIN SODIUM 2 G/50ML
2000 SOLUTION INTRAVENOUS ONCE
Status: COMPLETED | OUTPATIENT
Start: 2021-07-22 | End: 2021-07-22

## 2021-07-22 RX ORDER — PROPOFOL 10 MG/ML
INJECTION, EMULSION INTRAVENOUS AS NEEDED
Status: DISCONTINUED | OUTPATIENT
Start: 2021-07-22 | End: 2021-07-22

## 2021-07-22 RX ORDER — GLYCOPYRROLATE 0.2 MG/ML
INJECTION INTRAMUSCULAR; INTRAVENOUS AS NEEDED
Status: DISCONTINUED | OUTPATIENT
Start: 2021-07-22 | End: 2021-07-22

## 2021-07-22 RX ORDER — OXYCODONE HYDROCHLORIDE AND ACETAMINOPHEN 5; 325 MG/1; MG/1
1 TABLET ORAL EVERY 4 HOURS PRN
Status: DISCONTINUED | OUTPATIENT
Start: 2021-07-22 | End: 2021-07-22 | Stop reason: HOSPADM

## 2021-07-22 RX ORDER — DEXAMETHASONE SODIUM PHOSPHATE 10 MG/ML
INJECTION, SOLUTION INTRAMUSCULAR; INTRAVENOUS AS NEEDED
Status: DISCONTINUED | OUTPATIENT
Start: 2021-07-22 | End: 2021-07-22

## 2021-07-22 RX ORDER — ROCURONIUM BROMIDE 10 MG/ML
INJECTION, SOLUTION INTRAVENOUS AS NEEDED
Status: DISCONTINUED | OUTPATIENT
Start: 2021-07-22 | End: 2021-07-22

## 2021-07-22 RX ORDER — OXYCODONE HYDROCHLORIDE AND ACETAMINOPHEN 5; 325 MG/1; MG/1
2 TABLET ORAL EVERY 6 HOURS PRN
Status: DISCONTINUED | OUTPATIENT
Start: 2021-07-22 | End: 2021-07-22 | Stop reason: HOSPADM

## 2021-07-22 RX ORDER — ALBUTEROL SULFATE 2.5 MG/3ML
2.5 SOLUTION RESPIRATORY (INHALATION) ONCE AS NEEDED
Status: DISCONTINUED | OUTPATIENT
Start: 2021-07-22 | End: 2021-07-22 | Stop reason: HOSPADM

## 2021-07-22 RX ORDER — MIDAZOLAM HYDROCHLORIDE 2 MG/2ML
INJECTION, SOLUTION INTRAMUSCULAR; INTRAVENOUS AS NEEDED
Status: DISCONTINUED | OUTPATIENT
Start: 2021-07-22 | End: 2021-07-22

## 2021-07-22 RX ORDER — NEOSTIGMINE METHYLSULFATE 1 MG/ML
INJECTION INTRAVENOUS AS NEEDED
Status: DISCONTINUED | OUTPATIENT
Start: 2021-07-22 | End: 2021-07-22

## 2021-07-22 RX ORDER — SODIUM CHLORIDE 9 MG/ML
125 INJECTION, SOLUTION INTRAVENOUS CONTINUOUS
Status: DISCONTINUED | OUTPATIENT
Start: 2021-07-22 | End: 2021-07-22 | Stop reason: HOSPADM

## 2021-07-22 RX ORDER — FENTANYL CITRATE 50 UG/ML
INJECTION, SOLUTION INTRAMUSCULAR; INTRAVENOUS AS NEEDED
Status: DISCONTINUED | OUTPATIENT
Start: 2021-07-22 | End: 2021-07-22

## 2021-07-22 RX ORDER — HYDROMORPHONE HCL 110MG/55ML
PATIENT CONTROLLED ANALGESIA SYRINGE INTRAVENOUS AS NEEDED
Status: DISCONTINUED | OUTPATIENT
Start: 2021-07-22 | End: 2021-07-22

## 2021-07-22 RX ORDER — ONDANSETRON 2 MG/ML
INJECTION INTRAMUSCULAR; INTRAVENOUS AS NEEDED
Status: DISCONTINUED | OUTPATIENT
Start: 2021-07-22 | End: 2021-07-22

## 2021-07-22 RX ADMIN — KETOROLAC TROMETHAMINE 30 MG: 30 INJECTION, SOLUTION INTRAMUSCULAR; INTRAVENOUS at 16:23

## 2021-07-22 RX ADMIN — HYDROMORPHONE HYDROCHLORIDE 0.5 MG: 2 INJECTION INTRAMUSCULAR; INTRAVENOUS; SUBCUTANEOUS at 15:26

## 2021-07-22 RX ADMIN — PROPOFOL 200 MG: 10 INJECTION, EMULSION INTRAVENOUS at 14:20

## 2021-07-22 RX ADMIN — GLYCOPYRROLATE 0.6 MG: 0.2 INJECTION, SOLUTION INTRAMUSCULAR; INTRAVENOUS at 16:26

## 2021-07-22 RX ADMIN — LIDOCAINE HYDROCHLORIDE 40 MG: 10 INJECTION, SOLUTION EPIDURAL; INFILTRATION; INTRACAUDAL; PERINEURAL at 14:20

## 2021-07-22 RX ADMIN — NEOSTIGMINE METHYLSULFATE 3 MG: 1 INJECTION INTRAVENOUS at 16:26

## 2021-07-22 RX ADMIN — SODIUM CHLORIDE: 0.9 INJECTION, SOLUTION INTRAVENOUS at 14:39

## 2021-07-22 RX ADMIN — ONDANSETRON 4 MG: 2 INJECTION INTRAMUSCULAR; INTRAVENOUS at 14:30

## 2021-07-22 RX ADMIN — SODIUM CHLORIDE, SODIUM LACTATE, POTASSIUM CHLORIDE, AND CALCIUM CHLORIDE 125 ML/HR: .6; .31; .03; .02 INJECTION, SOLUTION INTRAVENOUS at 12:20

## 2021-07-22 RX ADMIN — HYDROMORPHONE HYDROCHLORIDE 0.5 MG: 2 INJECTION INTRAMUSCULAR; INTRAVENOUS; SUBCUTANEOUS at 15:18

## 2021-07-22 RX ADMIN — HYDROMORPHONE HYDROCHLORIDE 0.5 MG: 2 INJECTION INTRAMUSCULAR; INTRAVENOUS; SUBCUTANEOUS at 15:53

## 2021-07-22 RX ADMIN — FENTANYL CITRATE 50 MCG: 50 INJECTION INTRAMUSCULAR; INTRAVENOUS at 14:56

## 2021-07-22 RX ADMIN — DEXAMETHASONE SODIUM PHOSPHATE 4 MG: 10 INJECTION, SOLUTION INTRAMUSCULAR; INTRAVENOUS at 14:30

## 2021-07-22 RX ADMIN — ROCURONIUM BROMIDE 50 MG: 50 INJECTION, SOLUTION INTRAVENOUS at 14:20

## 2021-07-22 RX ADMIN — MIDAZOLAM 2 MG: 1 INJECTION INTRAMUSCULAR; INTRAVENOUS at 14:11

## 2021-07-22 RX ADMIN — FENTANYL CITRATE 50 MCG: 50 INJECTION INTRAMUSCULAR; INTRAVENOUS at 14:58

## 2021-07-22 RX ADMIN — SODIUM CHLORIDE: 0.9 INJECTION, SOLUTION INTRAVENOUS at 16:27

## 2021-07-22 RX ADMIN — FENTANYL CITRATE 100 MCG: 50 INJECTION INTRAMUSCULAR; INTRAVENOUS at 14:15

## 2021-07-22 RX ADMIN — CEFAZOLIN SODIUM 2000 MG: 2 SOLUTION INTRAVENOUS at 14:04

## 2021-07-22 NOTE — ANESTHESIA PREPROCEDURE EVALUATION
Procedure:  REPAIR HERNIA INGUINAL ROBOTIC (Left Groin)    Relevant Problems   GI/HEPATIC   (+) GI bleed      NEURO/PSYCH   (+) Anxiety      PULMONARY   (+) Asthma   (+) Uncomplicated asthma        Physical Exam    Airway    Mallampati score: II  TM Distance: >3 FB  Neck ROM: full     Dental       Cardiovascular  Rhythm: regular, Cardiovascular exam normal    Pulmonary  Pulmonary exam normal Breath sounds clear to auscultation,     Other Findings        Anesthesia Plan  ASA Score- 2     Anesthesia Type- general with ASA Monitors  Additional Monitors:   Airway Plan: ETT  Plan Factors-    Chart reviewed  Patient summary reviewed  Patient is not a current smoker  Patient instructed to abstain from smoking on day of procedure  Induction- intravenous  Postoperative Plan- Plan for postoperative opioid use  Planned trial extubation    Informed Consent- Anesthetic plan and risks discussed with patient (SO)

## 2021-07-22 NOTE — ANESTHESIA POSTPROCEDURE EVALUATION
Post-Op Assessment Note    CV Status:  Stable    Pain management: adequate     Mental Status:  Alert and awake   Hydration Status:  Euvolemic   PONV Controlled:  Controlled   Airway Patency:  Patent      Post Op Vitals Reviewed: Yes      Staff: Anesthesiologist         No complications documented      /73 (07/22/21 1848)    Temp (!) 97 2 °F (36 2 °C) (07/22/21 1848)    Pulse 78 (07/22/21 1848)   Resp 13 (07/22/21 1848)    SpO2 92 % (07/22/21 1848)

## 2021-07-22 NOTE — OP NOTE
OPERATIVE REPORT  PATIENT NAME: Martha Ha    :  1970  MRN: [id number]  Pt Location: [facility] OR ROOM 08    SURGERY DATE: 2021    Surgeon(s) and Role:     * Enoc Krause MD - Primary     * Sirisha New PA-C - Assisting     * Rubens Reyes MD - Observing    Preop Diagnosis:  Left inguinal hernia [K40 90]    Post-Op Diagnosis Codes:     * Left inguinal hernia [K40 90]    Procedure(s) (LRB):  REPAIR HERNIA INGUINAL ROBOTIC WITH MESH (Left)    Specimen(s):  ID Type Source Tests Collected by Time Destination   1 :  Tissue Omentum TISSUE EXAM Enoc Krause MD 2021 1620        Estimated Blood Loss:   100 mL    Drains:  * No LDAs found *    Anesthesia Type:   General    Operative Indications:  Left inguinal hernia [K40 90]      Operative Findings:  Very large left scrotal indirect inguinal hernia containing incarcerated omentum    Complications:   None    Procedure and Technique:  The patient was seen again in the Holding Room  The risks, benefits, complications, treatment options, and expected outcomes were discussed with the patient  The possibilities of reaction to medication, pulmonary aspiration, perforation of viscus, bleeding, postoperative short or long term nerve entrapment causing pain,recurrent infection, the need for additional procedures, and development of a complication requiring transfusion or further operation were discussed with the patient and/or family  There was concurrence with the proposed plan, and informed consent was obtained  The site of surgery was properly noted/marked  The patient was taken to the Operating Room, identified as Martha Ha and the procedure verified as hernia repair  A Time Out was held and the above information confirmed  The patient was prepped and draped in a sterile fashion  A timeout was again performed  Local anesthesia was used in the incision  An umbilical incision was made    Dissection carried out to the fascia which was grasped with Kocher's and elevated  The fascia was incised an 8 mm trocar was placed in under direct visualization  The abdomen was inflated and the camera was placed in    Two8 mm trocars were placed lateral to rectus muscle approximately at the level of the umbilicus  At this point the patient was placed into Trendelenburg position and the robot was docked and the instruments were placed in  The patient was noted to have left inguinal hernia   There was a severe amount of incarcerated omentum that was dissected with combination of blunt dissection and Bovie cautery  There was a significant amount of abnormal omentum that was then resected with the Harmonic scalpel and removed through the umbilical incision  The peritoneum was incised from the medial umbilical fold out laterally past the internal ring on the left  Next the direct space was mobilized by exposing Jimmy's ligament all the way along its length to the pubic tubercle  If a direct hernia defect was seen this was dissected and reduced  If there was indirect hernia sac this was carefully mobilized off the cord structures with care to avoid injury to the gonadal vessels or spermatic cord  The remainder of the inferior flap was created  At this point  Pro  mesh was selected and placed into the preperitoneal space  The mesh was deployed and placed in the appropriate position  The edge of the peritoneum was well below the edge of the mesh  The peritoneum was then sutured closed with the V lock suture  Now the repair was complete the robot was undocked  The umbilical trocor site was closed with an  0-vicryl using a suture Passer  The wound was closed in multiple layers using 3-0 Vicryl sutures and the skin closed using a 4-0 Monocryl subcuticular stitch  The wound was dressed  The patient was anatomically correct at the end of the procedure  The patient tolerated the procedure in good condition      Instrument, sponge, and needle counts were correct prior to closure and at the conclusion of the case  The PA was essential for assistance with abdominal access and docking the robot as well as retraction and suturing  This text is generated with voice recognition software  There may be translation, syntax,  or grammatical errors  If you have any questions, please contact the dictating provider        I was present for the entire procedure, A qualified resident physician was not available and A physician assistant was required during the procedure for retraction tissue handling,dissection and suturing    Patient Disposition:  PACU     SIGNATURE: Chandan Jensen MD  DATE: July 22, 2021  TIME: 4:24 PM

## 2021-07-22 NOTE — DISCHARGE INSTRUCTIONS
[facility] Surgical  Post-Operative Care Instructions  Dr Savanna Zhou MD, [facility]  [phone]    1  General: You will feel pulling sensations around the wound or funny aches and pains around the incisions  This is normal  Even minor surgery is a change in your body and this is your bodys way of reaction to it  If you have had abdominal surgery, it may help to support the incision with a small pillow or blanket for comfort when moving or coughing  2  Wound care:  Okay to shower  The glue will fall off over the next week or 2     3  Water: You may shower over the wound, unless there are drain tubes left in place  Do not bathe or use a pool or hot tub until cleared by the physician  4  Activity: You may go up and down stairs, walk as much as you are comfortable, but walk at least 3 times each day  If you have had abdominal surgery, do not lift anything heavier than 20 pounds for at least 4 weeks, unless cleared by the doctor  5  Diet: You may resume a regular diet  If you had a same-day surgery or overnight stay surgery, he may wish to eat lightly for a few days: soups, crackers, and sandwiches  You may resume a regular diet when ready  6  Medications: Resume all of your previous medications, unless told otherwise by the doctor  A good option for pain control is to start with Tylenol and ibuprofen and alternate taking them every 2 hours for 1-2 days  If this is not sufficient then substituted the Tylenol for the narcotic pain medicine prescribed  Insure that you do not take more than 4000 mg of Tylenol per day  You do not need to take the narcotic pain medications unless you are having significant pain and discomfort  7  Driving: You will need someone to drive you home on the day of surgery  Do not drive or make any important decisions while on narcotic pain medication or 24 hours and after anesthesia or sedation for surgery   Generally, you may drive when your off all narcotic pain medications  8  Upset Stomach: You may take Maalox, Tums, or similar items for an upset stomach  If your narcotic pain medication causes an upset stomach, do not take it on an empty stomach  Try taking it with at least some crackers or toast      9  Constipation: Patients often experienced constipation after surgery  You may take over-the-counter medication for this, such as Metamucil, Senokot, Dulcolax, milk of magnesia, etc  You may take a suppository unless you have had anorectal surgery such as a procedure on your hemorrhoids  If you experience significant nausea or vomiting after abdominal surgery, call the office before trying any of these medications  10  Call the office: If you are experiencing any of the following, fevers above 101 5°, significant nausea or vomiting, if the wound develops drainage and/or is excessive redness around the wound, or if you have significant diarrhea or other worsening symptoms  11  Pain: You may be given a prescription for pain  This will be given to the hospital, the day of surgery  12  Sexual Activity: You may resume sexual activity when you feel ready and comfortable and your incision is sealed and healed without apparent infection risk
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
## 2021-08-04 ENCOUNTER — OFFICE VISIT (OUTPATIENT)
Dept: SURGERY | Facility: CLINIC | Age: 51
End: 2021-08-04

## 2021-08-04 VITALS — BODY MASS INDEX: 24.73 KG/M2 | TEMPERATURE: 97.8 F | HEIGHT: 69 IN | WEIGHT: 167 LBS

## 2021-08-04 DIAGNOSIS — K40.90 LEFT INGUINAL HERNIA: Primary | ICD-10-CM

## 2021-08-04 PROCEDURE — 99024 POSTOP FOLLOW-UP VISIT: CPT | Performed by: SURGERY
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
## 2021-08-09 ENCOUNTER — OFFICE VISIT (OUTPATIENT)
Dept: FAMILY MEDICINE CLINIC | Facility: CLINIC | Age: 51
End: 2021-08-09
Payer: COMMERCIAL

## 2021-08-09 VITALS
DIASTOLIC BLOOD PRESSURE: 80 MMHG | HEART RATE: 64 BPM | SYSTOLIC BLOOD PRESSURE: 124 MMHG | WEIGHT: 168 LBS | HEIGHT: 69 IN | TEMPERATURE: 98.6 F | BODY MASS INDEX: 24.88 KG/M2

## 2021-08-09 DIAGNOSIS — J45.909 MILD ASTHMA WITHOUT COMPLICATION, UNSPECIFIED WHETHER PERSISTENT: ICD-10-CM

## 2021-08-09 DIAGNOSIS — K92.0 GASTROINTESTINAL HEMORRHAGE WITH HEMATEMESIS: ICD-10-CM

## 2021-08-09 DIAGNOSIS — K40.90 LEFT INGUINAL HERNIA: Primary | ICD-10-CM

## 2021-08-09 PROCEDURE — 3008F BODY MASS INDEX DOCD: CPT | Performed by: PHYSICIAN ASSISTANT

## 2021-08-09 PROCEDURE — 1036F TOBACCO NON-USER: CPT | Performed by: PHYSICIAN ASSISTANT

## 2021-08-09 PROCEDURE — 99024 POSTOP FOLLOW-UP VISIT: CPT | Performed by: PHYSICIAN ASSISTANT

## 2021-08-09 RX ORDER — MOMETASONE FUROATE AND FORMOTEROL FUMARATE DIHYDRATE 200; 5 UG/1; UG/1
2 AEROSOL RESPIRATORY (INHALATION) 2 TIMES DAILY
Qty: 13 G | Refills: 5 | Status: SHIPPED | OUTPATIENT
Start: 2021-08-09 | End: 2021-11-30 | Stop reason: SDUPTHER

## 2021-08-09 NOTE — PATIENT INSTRUCTIONS
Assessment/plan:  1  Inguinal hernia -status post surgery with Dr Deonte Tolbert  He does continue with some significant soreness of the abdomen with bending and twisting  He will be kept out of work [employer], 2021  If he is still having significant problems at that point would recommend referral back to General surgery  2   Moderate persistent asthma -not at goal with Symbicort  Recommend switching back to [facility] which had worked better for him previously  Would recommend prior authorization if necessary  3  History of GI bleed-presently stable on proton pump inhibitor therapy, no medication changes

## 2021-08-09 NOTE — LETTER
August 9, 2021     Patient: Arnie Liriano   YOB: 1970   Date of Visit: 8/9/2021       To Whom it May Concern:    Loralie Meigs is under my professional care  He was seen in my office on 8/9/2021  He may return to work on 09/19/2021  If you have any questions or concerns, please don't hesitate to call           Sincerely,          Jaison Brown PA-C        CC: No Recipients

## 2021-08-09 NOTE — PROGRESS NOTES
Assessment and Plan:  Patient Instructions     Assessment/plan:  1  Inguinal hernia -status post surgery with Dr Rosalynd Spatz  He does continue with some significant soreness of the abdomen with bending and twisting  He will be kept out of work Webs 19, 2021  If he is still having significant problems at that point would recommend referral back to General surgery  2   Moderate persistent asthma -not at goal with Symbicort  Recommend switching back to [facility] which had worked better for him previously  Would recommend prior authorization if necessary  3  History of GI bleed-presently stable on proton pump inhibitor therapy, no medication changes  Problem List Items Addressed This Visit        Digestive    GI bleed       Respiratory    Asthma    Relevant Medications    mometasone-formoterol (Dulera) 200-5 MCG/ACT inhaler       Other    Left inguinal hernia - Primary                 Diagnoses and all orders for this visit:    Left inguinal hernia    Mild asthma without complication, unspecified whether persistent  -     mometasone-formoterol (Dulera) 200-5 MCG/ACT inhaler; Inhale 2 puffs 2 (two) times a day Rinse mouth after use  Gastrointestinal hemorrhage with hematemesis              Subjective:      Patient ID: Poli Cerrato is a 49 y o  male  CC:    Chief Complaint   Patient presents with    Post-op     Pt states he had hernia repair 25 of July  He has a return to work note from Dr Rosalynd Spatz to return 25 of this month, but pt states he is still having problems   Other     Pt questions if he should have COVID vaccine due to his medical history  He also has some medication questions  -  [facility]       HPI:      HPI:  This is a 52-year-old gentleman that presents to the office for follow-up of recent hernia surgery  He had a large inguinal hernia which was treated by Dr Rosalynd Spatz      He is currently signed out of work until the 22nd of August but he states that he continues with significant pain and discomfort when bending and twisting and lying in certain positions  He states that his job does require him to do a fair amount of bending, lifting, and twisting  He is interested in waiting to return until he is 100%  He still feels that there is some some muscular abnormality where 1 of the laparoscopy port holes was closed  He also has a history of asthma and states that the Symbicort has not been helping him as well as the [facility]  He would like to be switched back to the [facility] and try to get it covered by his insurance  The following portions of the patient's history were reviewed and updated as appropriate: allergies, current medications, past family history, past medical history, past social history, past surgical history and problem list       Review of Systems   Constitutional: Negative for chills, fatigue and fever  HENT: Negative for congestion, ear pain and sinus pressure  Eyes: Negative for visual disturbance  Respiratory: Negative for cough, chest tightness and shortness of breath  Cardiovascular: Negative for chest pain and palpitations  Gastrointestinal: Negative for diarrhea, nausea and vomiting  Endocrine: Negative for polyuria  Genitourinary: Negative for dysuria and frequency  Musculoskeletal: Negative for arthralgias and myalgias  Skin: Negative for pallor and rash  Neurological: Negative for dizziness, weakness, light-headedness, numbness and headaches  Psychiatric/Behavioral: Negative for agitation, behavioral problems and sleep disturbance  All other systems reviewed and are negative  Data to review:       Objective:    Vitals:    08/09/21 1601   BP: 124/80   BP Location: Left arm   Patient Position: Sitting   Cuff Size: Large   Pulse: 64   Temp: 98 6 °F (37 °C)   TempSrc: Oral   Weight: 76 2 kg (168 lb)   Height: 5' 9" (1 753 m)        Physical Exam  Constitutional:       General: He is not in acute distress       Appearance: He is well-developed  HENT:      Head: Normocephalic and atraumatic  Right Ear: Tympanic membrane normal       Left Ear: Tympanic membrane normal    Eyes:      Conjunctiva/sclera: Conjunctivae normal    Cardiovascular:      Rate and Rhythm: Normal rate and regular rhythm  Pulmonary:      Effort: Pulmonary effort is normal    Abdominal:      General: Abdomen is flat  Bowel sounds are normal  There is no distension  Palpations: Abdomen is soft  There is no mass  Musculoskeletal:         General: Normal range of motion  Cervical back: Normal range of motion  Skin:     General: Skin is warm  Findings: No rash  Neurological:      Mental Status: He is alert and oriented to person, place, and time     Psychiatric:         Mood and Affect: Mood normal

## 2021-09-23 DIAGNOSIS — F41.9 ANXIETY: ICD-10-CM

## 2021-09-23 RX ORDER — HYDROXYZINE HYDROCHLORIDE 25 MG/1
25 TABLET, FILM COATED ORAL EVERY 6 HOURS PRN
Qty: 360 TABLET | Refills: 1 | Status: SHIPPED | OUTPATIENT
Start: 2021-09-23

## 2021-10-13 ENCOUNTER — OFFICE VISIT (OUTPATIENT)
Dept: FAMILY MEDICINE CLINIC | Facility: CLINIC | Age: 51
End: 2021-10-13
Payer: COMMERCIAL

## 2021-10-13 VITALS
BODY MASS INDEX: 25.62 KG/M2 | RESPIRATION RATE: 18 BRPM | SYSTOLIC BLOOD PRESSURE: 130 MMHG | HEART RATE: 69 BPM | TEMPERATURE: 98 F | DIASTOLIC BLOOD PRESSURE: 90 MMHG | OXYGEN SATURATION: 97 % | HEIGHT: 69 IN | WEIGHT: 173 LBS

## 2021-10-13 DIAGNOSIS — Z23 FLU VACCINE NEED: Primary | ICD-10-CM

## 2021-10-13 DIAGNOSIS — K40.90 LEFT INGUINAL HERNIA: ICD-10-CM

## 2021-10-13 DIAGNOSIS — R07.89 OTHER CHEST PAIN: ICD-10-CM

## 2021-10-13 DIAGNOSIS — J45.909 MILD ASTHMA WITHOUT COMPLICATION, UNSPECIFIED WHETHER PERSISTENT: ICD-10-CM

## 2021-10-13 PROCEDURE — 99214 OFFICE O/P EST MOD 30 MIN: CPT | Performed by: FAMILY MEDICINE

## 2021-10-13 PROCEDURE — 90682 RIV4 VACC RECOMBINANT DNA IM: CPT

## 2021-10-13 PROCEDURE — 3725F SCREEN DEPRESSION PERFORMED: CPT | Performed by: FAMILY MEDICINE

## 2021-10-13 PROCEDURE — 90471 IMMUNIZATION ADMIN: CPT

## 2021-10-13 PROCEDURE — 1036F TOBACCO NON-USER: CPT | Performed by: FAMILY MEDICINE

## 2021-10-13 PROCEDURE — 3008F BODY MASS INDEX DOCD: CPT | Performed by: FAMILY MEDICINE

## 2021-10-13 RX ORDER — PREDNISONE 50 MG/1
TABLET ORAL AS NEEDED
COMMUNITY
Start: 2021-09-14

## 2021-10-13 RX ORDER — DIPHENHYDRAMINE HCL 50 MG
CAPSULE ORAL AS NEEDED
COMMUNITY
Start: 2021-09-14

## 2021-11-09 ENCOUNTER — OFFICE VISIT (OUTPATIENT)
Dept: FAMILY MEDICINE CLINIC | Facility: CLINIC | Age: 51
End: 2021-11-09
Payer: COMMERCIAL

## 2021-11-09 VITALS
BODY MASS INDEX: 26 KG/M2 | TEMPERATURE: 97.8 F | RESPIRATION RATE: 18 BRPM | WEIGHT: 175.5 LBS | SYSTOLIC BLOOD PRESSURE: 144 MMHG | HEART RATE: 60 BPM | DIASTOLIC BLOOD PRESSURE: 90 MMHG | HEIGHT: 69 IN | OXYGEN SATURATION: 98 %

## 2021-11-09 DIAGNOSIS — F41.9 ANXIETY: ICD-10-CM

## 2021-11-09 DIAGNOSIS — D50.9 IRON DEFICIENCY ANEMIA, UNSPECIFIED IRON DEFICIENCY ANEMIA TYPE: ICD-10-CM

## 2021-11-09 DIAGNOSIS — L30.9 DERMATITIS: ICD-10-CM

## 2021-11-09 DIAGNOSIS — I42.9 CARDIOMYOPATHY, UNSPECIFIED TYPE (HCC): Primary | ICD-10-CM

## 2021-11-09 DIAGNOSIS — J45.909 MILD ASTHMA WITHOUT COMPLICATION, UNSPECIFIED WHETHER PERSISTENT: ICD-10-CM

## 2021-11-09 PROBLEM — Z91.013 SHELLFISH ALLERGY: Status: ACTIVE | Noted: 2020-09-14

## 2021-11-09 PROBLEM — K92.2 ACUTE UPPER GASTROINTESTINAL BLEEDING: Status: ACTIVE | Noted: 2020-07-20

## 2021-11-09 PROCEDURE — 99214 OFFICE O/P EST MOD 30 MIN: CPT | Performed by: FAMILY MEDICINE

## 2021-11-09 PROCEDURE — 1036F TOBACCO NON-USER: CPT | Performed by: FAMILY MEDICINE

## 2021-11-09 PROCEDURE — 3008F BODY MASS INDEX DOCD: CPT | Performed by: FAMILY MEDICINE

## 2021-11-09 RX ORDER — ALBUTEROL SULFATE 90 UG/1
2 AEROSOL, METERED RESPIRATORY (INHALATION) EVERY 6 HOURS PRN
Qty: 18 G | Refills: 5 | Status: SHIPPED | OUTPATIENT
Start: 2021-11-09

## 2021-11-09 RX ORDER — PREDNISONE 20 MG/1
TABLET ORAL
Qty: 11 TABLET | Refills: 2 | Status: SHIPPED | OUTPATIENT
Start: 2021-11-09

## 2021-11-09 RX ORDER — METOPROLOL SUCCINATE 25 MG/1
25 TABLET, EXTENDED RELEASE ORAL DAILY
Qty: 30 TABLET | Refills: 5 | Status: SHIPPED | OUTPATIENT
Start: 2021-11-09 | End: 2022-05-21

## 2021-11-30 ENCOUNTER — TELEPHONE (OUTPATIENT)
Dept: FAMILY MEDICINE CLINIC | Facility: CLINIC | Age: 51
End: 2021-11-30

## 2022-01-10 ENCOUNTER — OFFICE VISIT (OUTPATIENT)
Dept: FAMILY MEDICINE CLINIC | Facility: CLINIC | Age: 52
End: 2022-01-10
Payer: COMMERCIAL

## 2022-01-10 VITALS
HEIGHT: 69 IN | OXYGEN SATURATION: 98 % | BODY MASS INDEX: 26.23 KG/M2 | TEMPERATURE: 97.8 F | DIASTOLIC BLOOD PRESSURE: 100 MMHG | RESPIRATION RATE: 18 BRPM | SYSTOLIC BLOOD PRESSURE: 150 MMHG | HEART RATE: 59 BPM | WEIGHT: 177.13 LBS

## 2022-01-10 DIAGNOSIS — J02.9 PHARYNGITIS, UNSPECIFIED ETIOLOGY: ICD-10-CM

## 2022-01-10 DIAGNOSIS — J02.9 SORETHROAT: Primary | ICD-10-CM

## 2022-01-10 PROCEDURE — 99213 OFFICE O/P EST LOW 20 MIN: CPT | Performed by: FAMILY MEDICINE

## 2022-01-10 PROCEDURE — 87070 CULTURE OTHR SPECIMN AEROBIC: CPT | Performed by: FAMILY MEDICINE

## 2022-01-10 PROCEDURE — 3008F BODY MASS INDEX DOCD: CPT | Performed by: FAMILY MEDICINE

## 2022-01-10 PROCEDURE — 1036F TOBACCO NON-USER: CPT | Performed by: FAMILY MEDICINE

## 2022-01-10 RX ORDER — AZITHROMYCIN 250 MG/1
TABLET, FILM COATED ORAL
Qty: 6 TABLET | Refills: 0 | Status: SHIPPED | OUTPATIENT
Start: 2022-01-10 | End: 2022-01-15

## 2022-01-10 RX ORDER — ONDANSETRON 4 MG/1
TABLET, ORALLY DISINTEGRATING ORAL
COMMUNITY
Start: 2021-12-02

## 2022-01-10 NOTE — ASSESSMENT & PLAN NOTE
Patient reports a history of Alcohol withdrawal seizures, N/V, tremors, and non-violent hallucinations and tactile disturbances.   Pharyngitis  Patient given prescription for Zithromax Z-Jose take as directed  We will check throat culture to verify possible infection    We will make further recommendations pending results of test
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
## 2022-01-13 LAB — BACTERIA THROAT CULT: NORMAL

## 2022-05-21 DIAGNOSIS — D50.9 IRON DEFICIENCY ANEMIA, UNSPECIFIED IRON DEFICIENCY ANEMIA TYPE: ICD-10-CM

## 2022-05-21 DIAGNOSIS — F41.9 ANXIETY: ICD-10-CM

## 2022-05-21 DIAGNOSIS — L30.9 DERMATITIS: ICD-10-CM

## 2022-05-21 DIAGNOSIS — J45.909 MILD ASTHMA WITHOUT COMPLICATION, UNSPECIFIED WHETHER PERSISTENT: ICD-10-CM

## 2022-05-21 RX ORDER — METOPROLOL SUCCINATE 25 MG/1
TABLET, EXTENDED RELEASE ORAL
Qty: 90 TABLET | Refills: 1 | Status: SHIPPED | OUTPATIENT
Start: 2022-05-21

## 2022-12-08 ENCOUNTER — OFFICE VISIT (OUTPATIENT)
Dept: FAMILY MEDICINE CLINIC | Facility: CLINIC | Age: 52
End: 2022-12-08

## 2022-12-08 ENCOUNTER — TELEPHONE (OUTPATIENT)
Dept: FAMILY MEDICINE CLINIC | Facility: CLINIC | Age: 52
End: 2022-12-08

## 2022-12-08 ENCOUNTER — APPOINTMENT (OUTPATIENT)
Dept: LAB | Facility: CLINIC | Age: 52
End: 2022-12-08

## 2022-12-08 VITALS
HEART RATE: 72 BPM | BODY MASS INDEX: 25.92 KG/M2 | RESPIRATION RATE: 18 BRPM | TEMPERATURE: 98.2 F | HEIGHT: 69 IN | WEIGHT: 175 LBS | SYSTOLIC BLOOD PRESSURE: 118 MMHG | DIASTOLIC BLOOD PRESSURE: 86 MMHG | OXYGEN SATURATION: 98 %

## 2022-12-08 DIAGNOSIS — J45.909 MILD ASTHMA WITHOUT COMPLICATION, UNSPECIFIED WHETHER PERSISTENT: ICD-10-CM

## 2022-12-08 DIAGNOSIS — Z23 FLU VACCINE NEED: Primary | ICD-10-CM

## 2022-12-08 DIAGNOSIS — I42.9 CARDIOMYOPATHY, UNSPECIFIED TYPE (HCC): ICD-10-CM

## 2022-12-08 DIAGNOSIS — D50.9 IRON DEFICIENCY ANEMIA, UNSPECIFIED IRON DEFICIENCY ANEMIA TYPE: ICD-10-CM

## 2022-12-08 DIAGNOSIS — F41.9 ANXIETY: ICD-10-CM

## 2022-12-08 DIAGNOSIS — R35.1 NOCTURIA: ICD-10-CM

## 2022-12-08 PROBLEM — R07.89 OTHER CHEST PAIN: Status: RESOLVED | Noted: 2021-10-13 | Resolved: 2022-12-08

## 2022-12-08 PROBLEM — J02.9 PHARYNGITIS: Status: RESOLVED | Noted: 2022-01-10 | Resolved: 2022-12-08

## 2022-12-08 LAB
ALBUMIN SERPL BCP-MCNC: 3.8 G/DL (ref 3.5–5)
ALP SERPL-CCNC: 59 U/L (ref 46–116)
ALT SERPL W P-5'-P-CCNC: 27 U/L (ref 12–78)
ANION GAP SERPL CALCULATED.3IONS-SCNC: 4 MMOL/L (ref 4–13)
AST SERPL W P-5'-P-CCNC: 16 U/L (ref 5–45)
BILIRUB SERPL-MCNC: 0.54 MG/DL (ref 0.2–1)
BUN SERPL-MCNC: 10 MG/DL (ref 5–25)
CALCIUM SERPL-MCNC: 9.7 MG/DL (ref 8.3–10.1)
CHLORIDE SERPL-SCNC: 103 MMOL/L (ref 96–108)
CHOLEST SERPL-MCNC: 203 MG/DL
CO2 SERPL-SCNC: 30 MMOL/L (ref 21–32)
CREAT SERPL-MCNC: 1.25 MG/DL (ref 0.6–1.3)
ERYTHROCYTE [DISTWIDTH] IN BLOOD BY AUTOMATED COUNT: 12.2 % (ref 11.6–15.1)
GFR SERPL CREATININE-BSD FRML MDRD: 66 ML/MIN/1.73SQ M
GLUCOSE P FAST SERPL-MCNC: 96 MG/DL (ref 65–99)
HCT VFR BLD AUTO: 46.8 % (ref 36.5–49.3)
HDLC SERPL-MCNC: 64 MG/DL
HGB BLD-MCNC: 14.8 G/DL (ref 12–17)
LDLC SERPL CALC-MCNC: 117 MG/DL (ref 0–100)
MCH RBC QN AUTO: 27.3 PG (ref 26.8–34.3)
MCHC RBC AUTO-ENTMCNC: 31.6 G/DL (ref 31.4–37.4)
MCV RBC AUTO: 86 FL (ref 82–98)
NONHDLC SERPL-MCNC: 139 MG/DL
PLATELET # BLD AUTO: 206 THOUSANDS/UL (ref 149–390)
PMV BLD AUTO: 11.7 FL (ref 8.9–12.7)
POTASSIUM SERPL-SCNC: 4.2 MMOL/L (ref 3.5–5.3)
PROT SERPL-MCNC: 8.1 G/DL (ref 6.4–8.4)
PSA SERPL-MCNC: 0.5 NG/ML (ref 0–4)
RBC # BLD AUTO: 5.42 MILLION/UL (ref 3.88–5.62)
SODIUM SERPL-SCNC: 137 MMOL/L (ref 135–147)
TRIGL SERPL-MCNC: 108 MG/DL
TSH SERPL DL<=0.05 MIU/L-ACNC: 1.31 UIU/ML (ref 0.45–4.5)
WBC # BLD AUTO: 4.4 THOUSAND/UL (ref 4.31–10.16)

## 2022-12-08 RX ORDER — LOSARTAN POTASSIUM 25 MG/1
12.5 TABLET ORAL DAILY
COMMUNITY
Start: 2022-11-28 | End: 2022-12-08 | Stop reason: SDUPTHER

## 2022-12-08 RX ORDER — HYDROXYZINE HYDROCHLORIDE 25 MG/1
25 TABLET, FILM COATED ORAL EVERY 6 HOURS PRN
Qty: 360 TABLET | Refills: 1 | Status: SHIPPED | OUTPATIENT
Start: 2022-12-08

## 2022-12-08 RX ORDER — LOSARTAN POTASSIUM 25 MG/1
TABLET ORAL
Qty: 45 TABLET | Refills: 1 | Status: SHIPPED | OUTPATIENT
Start: 2022-12-08

## 2022-12-08 RX ORDER — METOPROLOL SUCCINATE 25 MG/1
25 TABLET, EXTENDED RELEASE ORAL DAILY
Qty: 90 TABLET | Refills: 1 | Status: SHIPPED | OUTPATIENT
Start: 2022-12-08

## 2022-12-08 NOTE — ASSESSMENT & PLAN NOTE
Cardiomyopathy  I had a long discussion with the patient regarding rationale for initiating losartan 12 5 mg daily due to his ejection fraction of 45%  Patient will be taking medication on a regular basis    He will follow-up with cardiology as ordered

## 2022-12-08 NOTE — ASSESSMENT & PLAN NOTE
Asthma  Patient's symptoms are fairly stable on current dose of Dulera and Proventil inhaler    He received his annual influenza vaccine today

## 2022-12-08 NOTE — TELEPHONE ENCOUNTER
----- Message from Michael Dunn MD sent at 23/0/6129  5:05 PM EST -----  All recent blood work stable for patient
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
## 2022-12-27 DIAGNOSIS — J45.909 MILD ASTHMA WITHOUT COMPLICATION, UNSPECIFIED WHETHER PERSISTENT: ICD-10-CM

## 2022-12-27 RX ORDER — MOMETASONE FUROATE AND FORMOTEROL FUMARATE DIHYDRATE 200; 5 UG/1; UG/1
AEROSOL RESPIRATORY (INHALATION)
Qty: 13 G | Refills: 2 | Status: SHIPPED | OUTPATIENT
Start: 2022-12-27

## 2023-04-25 ENCOUNTER — APPOINTMENT (EMERGENCY)
Dept: RADIOLOGY | Facility: HOSPITAL | Age: 53
End: 2023-04-25

## 2023-04-25 ENCOUNTER — HOSPITAL ENCOUNTER (EMERGENCY)
Facility: HOSPITAL | Age: 53
Discharge: HOME | End: 2023-04-25
Attending: EMERGENCY MEDICINE

## 2023-04-25 VITALS
RESPIRATION RATE: 20 BRPM | TEMPERATURE: 98.2 F | SYSTOLIC BLOOD PRESSURE: 161 MMHG | DIASTOLIC BLOOD PRESSURE: 88 MMHG | OXYGEN SATURATION: 98 % | HEART RATE: 63 BPM

## 2023-04-25 DIAGNOSIS — S62.639B OPEN FRACTURE OF TUFT OF DISTAL PHALANX OF FINGER: ICD-10-CM

## 2023-04-25 DIAGNOSIS — S60.10XA: Primary | ICD-10-CM

## 2023-04-25 RX ORDER — CEPHALEXIN 500 MG/1
500 CAPSULE ORAL EVERY 8 HOURS SCHEDULED
Qty: 21 CAPSULE | Refills: 0 | Status: SHIPPED | OUTPATIENT
Start: 2023-04-25 | End: 2023-05-02

## 2023-04-25 RX ORDER — LIDOCAINE HYDROCHLORIDE 10 MG/ML
10 INJECTION, SOLUTION EPIDURAL; INFILTRATION; INTRACAUDAL; PERINEURAL ONCE
Status: COMPLETED | OUTPATIENT
Start: 2023-04-25 | End: 2023-04-25

## 2023-04-25 RX ORDER — CEFAZOLIN SODIUM 2 G/50ML
2000 SOLUTION INTRAVENOUS ONCE
Status: COMPLETED | OUTPATIENT
Start: 2023-04-25 | End: 2023-04-25

## 2023-04-25 RX ORDER — TRANEXAMIC ACID 100 MG/ML
500 INJECTION, SOLUTION INTRAVENOUS ONCE
Status: COMPLETED | OUTPATIENT
Start: 2023-04-25 | End: 2023-04-25

## 2023-04-25 RX ORDER — IBUPROFEN 400 MG/1
400 TABLET ORAL ONCE
Status: COMPLETED | OUTPATIENT
Start: 2023-04-25 | End: 2023-04-25

## 2023-04-25 RX ORDER — LIDOCAINE HYDROCHLORIDE 10 MG/ML
INJECTION, SOLUTION EPIDURAL; INFILTRATION; INTRACAUDAL; PERINEURAL
Status: COMPLETED
Start: 2023-04-25 | End: 2023-04-25

## 2023-04-25 RX ORDER — OXYCODONE HYDROCHLORIDE AND ACETAMINOPHEN 5; 325 MG/1; MG/1
1 TABLET ORAL ONCE
Status: COMPLETED | OUTPATIENT
Start: 2023-04-25 | End: 2023-04-25

## 2023-04-25 RX ADMIN — TRANEXAMIC ACID 500 MG: 100 INJECTION INTRAVENOUS at 20:08

## 2023-04-25 RX ADMIN — LIDOCAINE HYDROCHLORIDE 10 ML: 10 INJECTION, SOLUTION EPIDURAL; INFILTRATION; INTRACAUDAL; PERINEURAL at 19:45

## 2023-04-25 RX ADMIN — LIDOCAINE HYDROCHLORIDE 10 ML: 10 INJECTION, SOLUTION EPIDURAL; INFILTRATION; INTRACAUDAL; PERINEURAL at 18:45

## 2023-04-25 RX ADMIN — IBUPROFEN 400 MG: 400 TABLET ORAL at 17:12

## 2023-04-25 RX ADMIN — OXYCODONE HYDROCHLORIDE AND ACETAMINOPHEN 1 TABLET: 5; 325 TABLET ORAL at 18:03

## 2023-04-25 RX ADMIN — CEFAZOLIN SODIUM 2000 MG: 2 SOLUTION INTRAVENOUS at 18:55

## 2023-04-25 NOTE — Clinical Note
Marichuy Ruvalcaba was seen and treated in our emergency department on 4/25/2023  No work until cleared by Family Doctor/Orthopedics        Diagnosis: Open fracture of tuft of right 3rd digit    Xavier    He may return on this date: 04/27/2023         If you have any questions or concerns, please don't hesitate to call        Anamika Pizarro MD    ______________________________           _______________          _______________  Hospital Representative                              Date                                Time

## 2023-04-25 NOTE — Clinical Note
Kiel Jean was seen and treated in our emergency department on 4/25/2023  Diagnosis:     Franck Cotter  may return to work on return date  He may return on this date: 04/27/2023         If you have any questions or concerns, please don't hesitate to call        Dereck Greene MD    ______________________________           _______________          _______________  Hospital Representative                              Date                                Time

## 2023-04-25 NOTE — Clinical Note
Mayelin Robles was seen and treated in our emergency department on 4/25/2023  Diagnosis:     [place]  may return to work on return date  He may return on this date: 04/27/2023         If you have any questions or concerns, please don't hesitate to call        Luis M Bo MD    ______________________________           _______________          _______________  Hospital Representative                              Date                                Time

## 2023-04-25 NOTE — Clinical Note
Shayna Francisco was seen and treated in our emergency department on 4/25/2023  No work until cleared by Family Doctor/Orthopedics        Diagnosis: Open fracture of tuft of right 3rd digit    Xavier    He may return on this date: 04/27/2023         If you have any questions or concerns, please don't hesitate to call        Zev Vizcaino MD    ______________________________           _______________          _______________  Hospital Representative                              Date                                Time

## 2023-04-25 NOTE — ED PROVIDER NOTES
History  Chief Complaint   Patient presents with   • Finger Pain     Patient slammed right 3rd digit in door, has some bleeding from his nail  60-year-old male is presenting with right third digit pain after slamming his hand in a car door at approximately noon today  (5 hours prior to arrival)  Patient reports immediate pain in the distal interphalangeal joint as well as underneath his right nailbed  Was actively bleeding for several hours but was able to achieve hemostasis after compression with Band-Aid type dressing  Patient reports blood underneath his fingernail on the distal fingernail tip  He has normal sensation of the fingertip with significant pain at the fingertip  Prior to Admission Medications   Prescriptions Last Dose Informant Patient Reported? Taking? Dulera 200-5 MCG/ACT inhaler   No No   Sig: INHALE 2 PUFFS BY MOUTH 2 TIMES A DAY RINSE MOUTH AFTER USE     Multiple Vitamins-Minerals (MULTIVITAMIN GUMMIES MENS PO)   Yes No   Sig: Take by mouth daily   acetaminophen (TYLENOL) 500 mg tablet   Yes No   Sig: Take 500 mg by mouth every 6 (six) hours as needed   albuterol (PROVENTIL HFA,VENTOLIN HFA) 90 mcg/act inhaler   No No   Sig: Inhale 2 puffs every 6 (six) hours as needed for wheezing   diphenhydrAMINE (BENADRYL) 50 mg capsule   Yes No   Sig: as needed    fluticasone-salmeterol (Advair) 250-50 mcg/dose inhaler   Yes No   Sig: Inhale 1 puff as needed    hydrOXYzine HCL (ATARAX) 25 mg tablet   No No   Sig: TAKE 1 TABLET (25 MG TOTAL) BY MOUTH EVERY 6 (SIX) HOURS AS NEEDED (AS NEEDED)   losartan (COZAAR) 25 mg tablet   No No   Si/2 tablet q day   metoprolol succinate (TOPROL-XL) 25 mg 24 hr tablet   No No   Sig: Take 1 tablet (25 mg total) by mouth daily   ondansetron (ZOFRAN-ODT) 4 mg disintegrating tablet   Yes No   pantoprazole (PROTONIX) 40 mg tablet   Yes No   Sig: Take 40 mg by mouth 2 (two) times a day with meals   predniSONE 50 mg tablet   Yes No   Sig: as needed Patient not taking: Reported on 1/10/2022   sildenafil (REVATIO) 20 mg tablet   No No   Sig: Take 1 tablet (20 mg total) by mouth 3 (three) times a day   Patient not taking: Reported on 10/13/2021   triamcinolone (KENALOG) 0 5 % cream   No No   Sig: Apply topically 2 (two) times a day      Facility-Administered Medications: None       Past Medical History:   Diagnosis Date   • Allergic rhinitis    • Anxiety    • Asthma    • Eczema    • Erectile dysfunction    • GERD (gastroesophageal reflux disease)    • History of transfusion     7/13/21 Pt states received blood when hospitalized over esophageal bleeding - no adverse reactions to blood tolerated transfusion well    • Left inguinal hernia    • Myocardial infarction [facility])     pt was told he had an infarct /documents indicate non ST elevation -sees cardiology at [facility]- had recent ECHO and stress test in 1/2021   • Peptic ulceration 07/20/2020    esophageal bleed   • Pneumonia    • Tachycardia     hx of SVT       Past Surgical History:   Procedure Laterality Date   • EGD AND COLONOSCOPY      7/2020   • SC LAPAROSCOPY SURG RPR INITIAL INGUINAL HERNIA Left 7/22/2021    Procedure: REPAIR HERNIA INGUINAL ROBOTIC WITH MESH;  Surgeon: Cyn Siddiqui MD;  Location: [facility];  Service: General   • WISDOM TOOTH EXTRACTION         Family History   Problem Relation Age of Onset   • Asthma Mother    • Diabetes Father      I have reviewed and agree with the history as documented      E-Cigarette/Vaping   • E-Cigarette Use Never User      E-Cigarette/Vaping Substances   • Nicotine No    • THC No      Social History     Tobacco Use   • Smoking status: Never   • Smokeless tobacco: Never   Vaping Use   • Vaping Use: Never used   Substance Use Topics   • Alcohol use: Yes     Comment: Occasional -once a week   • Drug use: Never        Review of Systems    Physical Exam  ED Triage Vitals [04/25/23 1636]   Temperature Pulse Respirations Blood Pressure SpO2   98 2 °F (36 8 °C) 63 20 161/88 98 %      Temp Source Heart Rate Source Patient Position - Orthostatic VS BP Location FiO2 (%)   Oral Monitor Lying Left arm --      Pain Score       10 - Worst Possible Pain             Orthostatic Vital Signs  Vitals:    04/25/23 1636   BP: 161/88   Pulse: 63   Patient Position - Orthostatic VS: Lying       Physical Exam    ED Medications  Medications   ibuprofen (MOTRIN) tablet 400 mg (400 mg Oral Given 4/25/23 1712)       Diagnostic Studies  Results Reviewed     None                 XR hand 3+ views RIGHT    (Results Pending)         Procedures  Procedures      ED Course                                       MDM      Disposition  Final diagnoses:   None     ED Disposition     None      Follow-up Information    None         Patient's Medications   Discharge Prescriptions    No medications on file     No discharge procedures on file  PDMP Review     None           ED Provider  Attending physically available and evaluated Donna Teran I managed the patient along with the ED Attending      Electronically Signed by "     Capillary Refill: Capillary refill takes less than 2 seconds  Neurological:      Mental Status: He is alert  Psychiatric:         Mood and Affect: Mood normal           ED Medications  Medications   ibuprofen (MOTRIN) tablet 400 mg (400 mg Oral Given 4/25/23 1712)   oxyCODONE-acetaminophen (PERCOCET) 5-325 mg per tablet 1 tablet (1 tablet Oral Given 4/25/23 1803)   ceFAZolin (ANCEF) IVPB (premix in dextrose) 2,000 mg 50 mL (0 mg Intravenous Stopped 4/25/23 1925)   lidocaine (PF) (XYLOCAINE-MPF) 1 % injection 10 mL (10 mL Infiltration Given 4/25/23 1945)   tranexamic acid 100mg/mL (for epistaxis) 500 mg (500 mg Nasal Given by Other 4/25/23 2008)       Diagnostic Studies  Results Reviewed     None                 XR hand 3+ views RIGHT   ED Interpretation by Denise Tinajero MD (04/25 1820)   Tuft fracture      Final Result by Jamie Scott MD (04/26 1401)      Third distal phalanx ungual tuft fracture      Corresponds with ED preliminary finding  *              Workstation performed: [id number]               Procedures  Laceration repair    Date/Time: 5/25/2023 7:15 PM  Performed by: Denise Tinajero MD  Authorized by: Denise Tinajero MD   Consent: Verbal consent obtained  Consent given by: patient  Patient understanding: patient states understanding of the procedure being performed  Patient consent: the patient's understanding of the procedure matches consent given  Site marked: the operative site was marked  Patient identity confirmed: verbally with patient  Time out: Immediately prior to procedure a \"time out\" was called to verify the correct patient, procedure, equipment, support staff and site/side marked as required    Body area: upper extremity  Location details: right long finger  Laceration length: 1 cm  Tendon involvement: none  Nerve involvement: none  Vascular damage: no  Anesthesia: digital block    Anesthesia:  Local Anesthetic: lidocaine 1% without epinephrine  Anesthetic total: " 7 mL    Sedation:  Patient sedated: no      Wound Dehiscence:  Superficial Wound Dehiscence: simple closure      Procedure Details:  Irrigation solution: saline and tap water  Irrigation method: jet lavage and tap  Amount of cleaning: standard  Debridement: moderate  Degree of undermining: none  Mucous membrane closure: 4-0 Chromic gut  Number of sutures: 1  Technique: simple  Approximation: close  Approximation difficulty: complex  Dressing: splint  Patient tolerance: patient tolerated the procedure well with no immediate complications  Comments: Laceration was underneath nailbed of the third digit  Nailbed was exposed using forceps and needle   Fingernail was retracted, subungual hematoma evacuated, fingernail removed approximately half-way down fingertip, other half left in place for protection  Retracted fingernail from nail bed, applied 1 suture to visible laceration using chromic  Then allowed fingernail to settle back over laceration, dressed with Vaseline gauze and then gauze roll, placed in aluminum finger splint for immobilization  ED Course                                       Medical Decision Making  Patient has open fracture to the third digit of right hand underneath nailbed  Evident on x-ray when first arrived in the emergency department  I did discuss the case with hand surgery who advised to lift patient's nailbed to repair laceration  See separate procedure note  Patient was placed in aluminum splint and then sent to hand surgery as outpatient for follow-up  Dose of Ancef given in the emergency department  Antibiotic prescription ordered  Amount and/or Complexity of Data Reviewed  Radiology: ordered and independent interpretation performed  Risk  Prescription drug management              Disposition  Final diagnoses:   Subungual hematoma of finger of right hand   Open fracture of tuft of distal phalanx of finger     Time reflects when diagnosis was documented in both MDM as applicable and the Disposition within this note     Time User Action Codes Description Comment    4/25/2023  8:44 PM Francisco Javierpariscat Kamara Add [N12 989Q] Closed fracture of tuft of distal phalanx of finger     4/25/2023  8:44 PM Randy Kamara Add [S60 10XA] Subungual hematoma of finger of right hand     4/25/2023  8:44 PM Randy Kamara Modify [S60 10XA] Subungual hematoma of finger of right hand     4/25/2023  8:44 PM Randy Kamara Remove [N72 086K] Closed fracture of tuft of distal phalanx of finger     4/25/2023  8:45 PM Randy Kamara Add [S62 639B] Open fracture of tuft of distal phalanx of finger       ED Disposition     ED Disposition   Discharge    Condition   Stable    Date/Time   Tue Apr 25, 2023  8:43 PM    Comment   Duyencat Das discharge to home/self care                 Follow-up Information     Follow up With Specialties Details Why Contact Info Additional [address] Emergency Department Emergency Medicine Go to  If symptoms worsen, As needed [address] [id number]  [address] Emergency Department, [address], [place]any Drape, [place], [address]    Qamar Masters, [facility] Orthopedic Surgery, Hand Surgery   [address] [facility] Amaya Rabago MD Family Medicine   [address] [place] [address]  [phone]             Discharge Medication List as of 4/25/2023  8:52 PM      START taking these medications    Details   cephalexin (KEFLEX) 500 mg capsule Take 1 capsule (500 mg total) by mouth every 8 (eight) hours for 7 days, Starting Tue 4/25/2023, Until Tue 5/2/2023, Normal         CONTINUE these medications which have NOT CHANGED    Details   acetaminophen (TYLENOL) 500 mg tablet Take 500 mg by mouth every 6 (six) hours as needed, Historical Med      albuterol (PROVENTIL HFA,VENTOLIN HFA) 90 mcg/act inhaler Inhale 2 puffs every 6 (six) hours as needed for wheezing, Starting Tue 11/9/2021, Normal      diphenhydrAMINE (BENADRYL) 50 mg capsule as needed , Starting Tue 9/14/2021, Historical Med      Dulera 200-5 MCG/ACT inhaler INHALE 2 PUFFS BY MOUTH 2 TIMES A DAY RINSE MOUTH AFTER USE , Normal      fluticasone-salmeterol (Advair) 250-50 mcg/dose inhaler Inhale 1 puff as needed , Historical Med      hydrOXYzine HCL (ATARAX) 25 mg tablet TAKE 1 TABLET (25 MG TOTAL) BY MOUTH EVERY 6 (SIX) HOURS AS NEEDED (AS NEEDED), Starting Fri 4/14/2023, Normal      losartan (COZAAR) 25 mg tablet 1/2 tablet q day, Normal      metoprolol succinate (TOPROL-XL) 25 mg 24 hr tablet Take 1 tablet (25 mg total) by mouth daily, Starting Thu 12/8/2022, Normal      Multiple Vitamins-Minerals (MULTIVITAMIN GUMMIES MENS PO) Take by mouth daily, Historical Med      ondansetron (ZOFRAN-ODT) 4 mg disintegrating tablet Starting Thu 12/2/2021, Historical Med      pantoprazole (PROTONIX) 40 mg tablet Take 40 mg by mouth 2 (two) times a day with meals, Starting Mon 7/27/2020, Historical Med      predniSONE 50 mg tablet as needed , Starting Tue 9/14/2021, Historical Med      sildenafil (REVATIO) 20 mg tablet Take 1 tablet (20 mg total) by mouth 3 (three) times a day, Starting Tue 9/10/2019, Print      triamcinolone (KENALOG) 0 5 % cream Apply topically 2 (two) times a day, Starting Thu 5/20/2021, Normal               PDMP Review     None           ED Provider  Attending physically available and evaluated Garcia Loza  I managed the patient along with the ED Attending      Electronically Signed by         Tito Harris MD  05/01/23 5973

## 2023-04-26 ENCOUNTER — TELEPHONE (OUTPATIENT)
Dept: OBGYN CLINIC | Facility: HOSPITAL | Age: 53
End: 2023-04-26

## 2023-04-26 NOTE — ED ATTENDING ATTESTATION
4/25/2023  IAlexandria DO, saw and evaluated the patient  I have discussed the patient with the resident/non-physician practitioner and agree with the resident's/non-physician practitioner's findings, Plan of Care, and MDM as documented in the resident's/non-physician practitioner's note, except where noted  All available labs and Radiology studies were reviewed  I was present for key portions of any procedure(s) performed by the resident/non-physician practitioner and I was immediately available to provide assistance  At this point I agree with the current assessment done in the Emergency Department  I have conducted an independent evaluation of this patient a history and physical is as follows:    21-year-old male presents with right third digit pain after slamming his hand in door at approximately noon today  Patient reports immediate pain and then noted that he was bleeding  Denies other complaints or injuries  On exam-no acute distress, heart regular, no respiratory distress, tenderness on the distal right third finger  Subungual hematoma in the distal aspect of the nail  Neurovascular intact  Plan-x-ray finger to rule out tuft fracture, likely trephinate nail  Check patient's tetanus status    ED Course     I reviewed x-ray with resident and there is a tuft fracture of the right third digit  Discussed with Ortho since this is the patient's dominant hand and there is active bleeding from finger      Critical Care Time  Procedures

## 2023-04-26 NOTE — TELEPHONE ENCOUNTER
Patient is being referred to a orthopedics  Please schedule accordingly      [address]   [phone]

## 2023-04-26 NOTE — DISCHARGE INSTRUCTIONS
Please see hand surgery for follow-up  Leave Vaseline gauze in place for next several days, perform dressing change thereafter  Please wear your finger splint until seen by orthopedic surgery  Use Motrin/Tylenol for pain

## 2023-04-26 NOTE — TELEPHONE ENCOUNTER
Caller: Ms Nahomy iVdales    Doctor/Office: manjit    Call regarding :  Request hand appt     Call was transferred to: kEaterina Madrid

## 2023-05-01 ENCOUNTER — OFFICE VISIT (OUTPATIENT)
Dept: OBGYN CLINIC | Facility: HOSPITAL | Age: 53
End: 2023-05-01

## 2023-05-01 ENCOUNTER — TELEPHONE (OUTPATIENT)
Dept: OBGYN CLINIC | Facility: CLINIC | Age: 53
End: 2023-05-01

## 2023-05-01 VITALS — SYSTOLIC BLOOD PRESSURE: 138 MMHG | HEART RATE: 65 BPM | DIASTOLIC BLOOD PRESSURE: 90 MMHG

## 2023-05-01 DIAGNOSIS — S62.632A CLOSED FRACTURE OF TUFT OF DISTAL PHALANX OF RIGHT MIDDLE FINGER: Primary | ICD-10-CM

## 2023-05-01 DIAGNOSIS — S67.10XA CRUSHING INJURY OF FINGER, INITIAL ENCOUNTER: ICD-10-CM

## 2023-05-01 NOTE — LETTER
May 1, 2023     Patient: Ruth Hernandez  YOB: 1970  Date of Visit: 5/1/2023      To Whom it May Concern:    Norwichmekhi Vyas is under my professional care  Johnnie Tanner was seen in my office on 5/1/2023  Johnnie Tanner is cleared to return to work beginning 5/2/2023 with the following restrictions:     No use of right upper extremity for next two weeks  He will be re-evaluated in two weeks, and restrictions will be reviewed and updated as necessary  If you have any questions or concerns, please don't hesitate to call           Sincerely,          Yuliya Eldridge MD        CC: No Recipients

## 2023-05-01 NOTE — PROGRESS NOTES
Karuna CHRISTOPHER  Attending, Orthopaedic Surgery  Hand, Wrist, and Elbow Surgery  [facility]      ORTHOPAEDIC HAND, WRIST, AND ELBOW OFFICE  VISIT       ASSESSMENT/PLAN:    52-year-old male with right long finger distal phalanx fracture secondary to crush injury DOI 4/25/2023    Reviewed today's physical exam findings and x-ray findings with patient at time of visit  Discussed with patient that his fracture and nail plate will resolve without surgical intervention  As his open wound is now dry, and healing, he can continue dressing the area as necessary, but he is encouraged to leave open to air when he is in controlled environments  He is restricted from participating in work-related responsibilities with the right upper extremity until cleared by physician  Documentation has been provided to this effect  He is provided a referral to OT for HEP for range of motion exercises to prevent stiffness  He will be reevaluated in 2 weeks  Should any questions or concerns arise prior to that timeframe, he can contact the office  Patient stresses understanding is in agreement with this treatment plan  The patient verbalized understanding of exam findings and treatment plan  We engaged in the shared decision-making process and treatment options were discussed at length with the patient  Surgical and conservative management discussed today along with risks and benefits  Diagnoses and all orders for this visit:    Closed fracture of tuft of distal phalanx of right middle finger  -     Ambulatory referral to PT/OT hand therapy; Future    Crushing injury of finger, initial encounter  -     Ambulatory referral to PT/OT hand therapy; Future      Follow Up:  Return in 2 weeks (on 5/15/2023) for Re-evaluation, Incision/wound care  To Do Next Visit:  Re-evaluation of current issue      General Discussions:  Fracture - Nonoperative Care:  The physiology of a fractured bone was discussed with the patient today  With non-displaced or minimally displaced fractures, conservative treatment such as casting or splinting often results in a functional recovery  Typically, these fractures are immobilized in either a cast or splint depending on the pattern  Radiographs are typically taken at intervals throughout the fracture healing to ensure that reduction or alignment is not lost   If the fracture loses its alignment, surgical intervention may be required to stabilize it  Medical conditions such as diabetes, osteoporosis, vitamin D deficiency, and a history of or exposure to smoking may delay or prevent fracture healing  Options between cast/splint immobilization and surgical treatment were offered and the risks and benefits of both were discussed  ____________________________________________________________________________________________________________________________________________      CHIEF COMPLAINT:  Chief Complaint   Patient presents with    Right Hand - Pain     C/o rt long finger injury (slammed in the door), DOI 4/25/23       SUBJECTIVE:  Chi Khan is a 47y o  year old RHD male who presents for evaluation of right long finger injury sustained 4/25/2023  Patient states that he accidentally closed his finger in a door jam resulting in immediate onset pain and decreased function  He was evaluated at his local [place] [address] ED where x-rays were taken and read as significant for a tuft fracture  He was placed in an AlumaFoam splint and then into a dressing and referred to orthopedics for further evaluation and management  Presentation he notes sensitivity to light touch in the distal phalanx, as well as pain with pressure  He denies any numbness or tingling  He states that he has been taking Tylenol OTC, but it has not been effective at managing his pain  He has continued to try to work using one hand only, but states that his functionality is significantly compromised        Pain/symptom timing:  Worse during the day when active  Pain/symptom context:  Worse with activites and work  Pain/symptom modifying factors:  Rest makes better, activities make worse  Pain/symptom associated signs/symptoms: none    Prior treatment   · NSAIDsYes   · Injections No   · Bracing/Orthotics Yes    Physical Therapy No     I have personally reviewed all the relevant PMH, PSH, SH, FH, Medications and allergies      PAST MEDICAL HISTORY:  Past Medical History:   Diagnosis Date    Allergic rhinitis     Anxiety     Asthma     Eczema     Erectile dysfunction     GERD (gastroesophageal reflux disease)     History of transfusion     7/13/21 Pt states received blood when hospitalized over esophageal bleeding - no adverse reactions to blood tolerated transfusion well     Left inguinal hernia     Myocardial infarction [facility])     pt was told he had an infarct /documents indicate non ST elevation -sees cardiology at [facility]- had recent ECHO and stress test in 1/2021    Peptic ulceration 07/20/2020    esophageal bleed    Pneumonia     Tachycardia     hx of SVT       PAST SURGICAL HISTORY:  Past Surgical History:   Procedure Laterality Date    EGD AND COLONOSCOPY      7/2020    MD LAPAROSCOPY SURG RPR INITIAL INGUINAL HERNIA Left 7/22/2021    Procedure: REPAIR HERNIA INGUINAL ROBOTIC WITH MESH;  Surgeon: Roxy Lainez MD;  Location: [facility] OR;  Service: General    WISDOM TOOTH EXTRACTION         FAMILY HISTORY:  Family History   Problem Relation Age of Onset    Asthma Mother     Diabetes Father        SOCIAL HISTORY:  Social History     Tobacco Use    Smoking status: Never    Smokeless tobacco: Never   Vaping Use    Vaping Use: Never used   Substance Use Topics    Alcohol use: Yes     Comment: Occasional -once a week    Drug use: Never       MEDICATIONS:    Current Outpatient Medications:     acetaminophen (TYLENOL) 500 mg tablet, Take 500 mg by mouth every 6 (six) hours as needed, Disp: , Rfl:     albuterol (PROVENTIL HFA,VENTOLIN HFA) 90 mcg/act inhaler, Inhale 2 puffs every 6 (six) hours as needed for wheezing, Disp: 18 g, Rfl: 5    cephalexin (KEFLEX) 500 mg capsule, Take 1 capsule (500 mg total) by mouth every 8 (eight) hours for 7 days, Disp: 21 capsule, Rfl: 0    diphenhydrAMINE (BENADRYL) 50 mg capsule, as needed , Disp: , Rfl:     Dulera 200-5 MCG/ACT inhaler, INHALE 2 PUFFS BY MOUTH 2 TIMES A DAY RINSE MOUTH AFTER USE , Disp: 13 g, Rfl: 2    fluticasone-salmeterol (Advair) 250-50 mcg/dose inhaler, Inhale 1 puff as needed , Disp: , Rfl:     hydrOXYzine HCL (ATARAX) 25 mg tablet, TAKE 1 TABLET (25 MG TOTAL) BY MOUTH EVERY 6 (SIX) HOURS AS NEEDED (AS NEEDED), Disp: 360 tablet, Rfl: 1    losartan (COZAAR) 25 mg tablet, 1/2 tablet q day, Disp: 45 tablet, Rfl: 1    metoprolol succinate (TOPROL-XL) 25 mg 24 hr tablet, Take 1 tablet (25 mg total) by mouth daily, Disp: 90 tablet, Rfl: 1    Multiple Vitamins-Minerals (MULTIVITAMIN GUMMIES MENS PO), Take by mouth daily, Disp: , Rfl:     ondansetron (ZOFRAN-ODT) 4 mg disintegrating tablet, , Disp: , Rfl:     pantoprazole (PROTONIX) 40 mg tablet, Take 40 mg by mouth 2 (two) times a day with meals, Disp: , Rfl:     predniSONE 50 mg tablet, as needed  (Patient not taking: Reported on 1/10/2022), Disp: , Rfl:     sildenafil (REVATIO) 20 mg tablet, Take 1 tablet (20 mg total) by mouth 3 (three) times a day (Patient not taking: Reported on 10/13/2021), Disp: 30 tablet, Rfl: 5    triamcinolone (KENALOG) 0 5 % cream, Apply topically 2 (two) times a day, Disp: 30 g, Rfl: 3    ALLERGIES:  Allergies   Allergen Reactions    Aminophylline Rash and Throat Swelling    Theophylline Rash and Throat Swelling    Salmeterol Hives, Other (See Comments) and Vomiting     HIVEs vomiting    Pollen Extract Other (See Comments)     Nasal congestion - triggers asthma    Iodine - Food Allergy Rash    Penicillins Rash    Shellfish-Derived Products - Food Allergy Rash       REVIEW OF SYSTEMS:  Review of Systems   Constitutional: Negative for chills, fever and unexpected weight change  HENT: Negative for hearing loss, nosebleeds and sore throat  Eyes: Negative for pain, redness and visual disturbance  Respiratory: Negative for cough, shortness of breath and wheezing  Cardiovascular: Negative for chest pain, palpitations and leg swelling  Gastrointestinal: Negative for abdominal pain, nausea and vomiting  Endocrine: Negative for polydipsia and polyuria  Genitourinary: Negative for dysuria and hematuria  Musculoskeletal:        As noted in HPI   Skin: Negative for rash and wound  Neurological: Negative for dizziness, numbness and headaches  Psychiatric/Behavioral: Negative for decreased concentration and suicidal ideas  The patient is not nervous/anxious  VITALS:  Vitals:    05/01/23 0808   BP: 138/90   Pulse: 65       LABS:  HgA1c: No results found for: HGBA1C  BMP:   Lab Results   Component Value Date    CALCIUM 9 7 12/08/2022    K 4 2 12/08/2022    CO2 30 12/08/2022     12/08/2022    BUN 10 12/08/2022    CREATININE 1 25 12/08/2022       _____________________________________________________  PHYSICAL EXAMINATION:  General: well developed and well nourished, alert, oriented times 3 and appears comfortable  Psychiatric: Normal  HEENT: Normocephalic, Atraumatic Trachea Midline, No torticollis  Pulmonary: No audible wheezing or respiratory distress   Abdomen/GI: Non tender, non distended   Cardiovascular: No pitting edema, 2+ radial pulse   Skin: No masses, erythema, lacerations, fluctation, ulcerations  Neurovascular: Sensation Intact to the Median, Ulnar, Radial Nerve, Motor Intact to the Median, Ulnar, Radial Nerve and Pulses Intact  Musculoskeletal: Normal, except as noted in detailed exam and in HPI        MUSCULOSKELETAL EXAMINATION:  Right hand/long finger -   Patient presents with no obvious anatomical deformity  Skin is warm and dry to touch with no signs of erythema, ecchymosis, infection  Soft tissue swelling of the distal and middle phalanx noted  Disruption of nail plate, underlying matrix is dry  FDS, FDP, extensor mechanisms are intact -motion of the DIP joint is stiff and painful  No rotational deformity with composite finger flexion  Exquisitely TTP over distal phalanx  Demonstrates normal wrist, elbow, and shoulder motion  Forearm compartments are soft and supple  2+ distal radial pulse with brisk capillary refill to the fingers  Radial, median, and ulnar motor and sensory distributions intact  Sensation light touch intact distally    ___________________________________________________  STUDIES REVIEWED:  Attending Physician has personally reviewed pertinent imaging in PACS, impression is as follows:  Review of radiographic series taken 4/25/2023 of the right long finger shows nondisplaced tuft fracture of distal phalanx      PROCEDURES PERFORMED:  Procedures  No Procedures performed today    _____________________________________________________      Jon Morocho    I,:  Berny Pelletier am acting as a scribe while in the presence of the attending physician :       I,:  Debbie Carpenter MD personally performed the services described in this documentation    as scribed in my presence :

## 2023-05-01 NOTE — TELEPHONE ENCOUNTER
----- Message from Jose Lira sent at 5/1/2023  9:21 AM EDT -----  Dr Xu Pina wants to see this patient back in 2 weeks      F/u right long finger - crush injury (not WC)    Phone [phone]    Thanks

## 2023-05-13 ENCOUNTER — HOSPITAL ENCOUNTER (EMERGENCY)
Facility: HOSPITAL | Age: 53
Discharge: HOME | End: 2023-05-13
Attending: EMERGENCY MEDICINE

## 2023-05-13 VITALS
DIASTOLIC BLOOD PRESSURE: 93 MMHG | SYSTOLIC BLOOD PRESSURE: 146 MMHG | HEART RATE: 70 BPM | OXYGEN SATURATION: 98 % | WEIGHT: 175 LBS | TEMPERATURE: 97.9 F | BODY MASS INDEX: 25.84 KG/M2 | RESPIRATION RATE: 18 BRPM

## 2023-05-13 DIAGNOSIS — B36.0 TINEA VERSICOLOR: Primary | ICD-10-CM

## 2023-05-13 RX ORDER — KETOCONAZOLE 20 MG/G
CREAM TOPICAL
Qty: 15 G | Refills: 0 | Status: SHIPPED | OUTPATIENT
Start: 2023-05-13 | End: 2023-05-27

## 2023-05-13 RX ORDER — PREDNISONE 20 MG/1
40 TABLET ORAL DAILY
Qty: 8 TABLET | Refills: 0 | Status: SHIPPED | OUTPATIENT
Start: 2023-05-14 | End: 2023-05-18

## 2023-05-13 RX ORDER — DIPHENHYDRAMINE HCL 25 MG
25 TABLET ORAL ONCE
Status: COMPLETED | OUTPATIENT
Start: 2023-05-13 | End: 2023-05-13

## 2023-05-13 RX ORDER — PREDNISONE 20 MG/1
60 TABLET ORAL ONCE
Status: COMPLETED | OUTPATIENT
Start: 2023-05-13 | End: 2023-05-13

## 2023-05-13 RX ADMIN — PREDNISONE 60 MG: 20 TABLET ORAL at 11:30

## 2023-05-13 RX ADMIN — DIPHENHYDRAMINE HCL 25 MG: 25 TABLET, COATED ORAL at 11:30

## 2023-05-13 NOTE — ED PROVIDER NOTES
History  Chief Complaint   Patient presents with   • Rash     Pt reports face and neck rash beginning yesterday evening  Reports shellfish allergy but denies exposure  Denies difficulty breathing  Airway intact     Patient is a 41-year-old male, past medical history including anxiety, asthma, and GERD, who presents to the emergency department for an itchy rash  Patient states that yesterday he began to itch his beard, and around his hairline and eyebrows  He notes there is now some discoloration to those itchy areas  No modifying factors  No associated symptoms  Denies any new soaps  Does use conditioner/oil but has not changed this recently  Denies any prior history of symptoms like this in the past   No other complaints or concerns at this time  Prior to Admission Medications   Prescriptions Last Dose Informant Patient Reported? Taking? Dulera 200-5 MCG/ACT inhaler   No No   Sig: INHALE 2 PUFFS BY MOUTH 2 TIMES A DAY RINSE MOUTH AFTER USE     Multiple Vitamins-Minerals (MULTIVITAMIN GUMMIES MENS PO)   Yes No   Sig: Take by mouth daily   acetaminophen (TYLENOL) 500 mg tablet   Yes No   Sig: Take 500 mg by mouth every 6 (six) hours as needed   albuterol (PROVENTIL HFA,VENTOLIN HFA) 90 mcg/act inhaler   No No   Sig: Inhale 2 puffs every 6 (six) hours as needed for wheezing   diphenhydrAMINE (BENADRYL) 50 mg capsule   Yes No   Sig: as needed    fluticasone-salmeterol (Advair) 250-50 mcg/dose inhaler   Yes No   Sig: Inhale 1 puff as needed    hydrOXYzine HCL (ATARAX) 25 mg tablet   No No   Sig: TAKE 1 TABLET (25 MG TOTAL) BY MOUTH EVERY 6 (SIX) HOURS AS NEEDED (AS NEEDED)   losartan (COZAAR) 25 mg tablet   No No   Si/2 tablet q day   metoprolol succinate (TOPROL-XL) 25 mg 24 hr tablet   No No   Sig: Take 1 tablet (25 mg total) by mouth daily   ondansetron (ZOFRAN-ODT) 4 mg disintegrating tablet   Yes No   pantoprazole (PROTONIX) 40 mg tablet   Yes No   Sig: Take 40 mg by mouth 2 (two) times a day with meals   predniSONE 50 mg tablet   Yes No   Sig: as needed    Patient not taking: Reported on 1/10/2022   sildenafil (REVATIO) 20 mg tablet   No No   Sig: Take 1 tablet (20 mg total) by mouth 3 (three) times a day   Patient not taking: Reported on 10/13/2021   triamcinolone (KENALOG) 0 5 % cream   No No   Sig: Apply topically 2 (two) times a day      Facility-Administered Medications: None       Past Medical History:   Diagnosis Date   • Allergic rhinitis    • Anxiety    • Asthma    • Eczema    • Erectile dysfunction    • GERD (gastroesophageal reflux disease)    • History of transfusion     7/13/21 Pt states received blood when hospitalized over esophageal bleeding - no adverse reactions to blood tolerated transfusion well    • Left inguinal hernia    • Myocardial infarction [facility])     pt was told he had an infarct /documents indicate non ST elevation -sees cardiology at [facility]- had recent ECHO and stress test in 1/2021   • Peptic ulceration 07/20/2020    esophageal bleed   • Pneumonia    • Tachycardia     hx of SVT       Past Surgical History:   Procedure Laterality Date   • EGD AND COLONOSCOPY      7/2020   • MA LAPAROSCOPY SURG RPR INITIAL INGUINAL HERNIA Left 7/22/2021    Procedure: REPAIR HERNIA INGUINAL ROBOTIC WITH MESH;  Surgeon: Mac Durbin MD;  Location: [facility];  Service: General   • WISDOM TOOTH EXTRACTION         Family History   Problem Relation Age of Onset   • Asthma Mother    • Diabetes Father      I have reviewed and agree with the history as documented  E-Cigarette/Vaping   • E-Cigarette Use Never User      E-Cigarette/Vaping Substances   • Nicotine No    • THC No      Social History     Tobacco Use   • Smoking status: Never   • Smokeless tobacco: Never   Vaping Use   • Vaping Use: Never used   Substance Use Topics   • Alcohol use: Yes     Comment: Occasional -once a week   • Drug use: Never        Review of Systems   Constitutional: Negative for chills and fever     Gastrointestinal: Negative for nausea and vomiting  Skin: Positive for rash  All other systems reviewed and are negative  Physical Exam  ED Triage Vitals   Temperature Pulse Respirations Blood Pressure SpO2   05/13/23 1056 05/13/23 1057 05/13/23 1057 05/13/23 1057 05/13/23 1057   97 9 °F (36 6 °C) 70 18 146/93 98 %      Temp Source Heart Rate Source Patient Position - Orthostatic VS BP Location FiO2 (%)   05/13/23 1056 05/13/23 1057 -- -- --   Tympanic Monitor         Pain Score       --                    Orthostatic Vital Signs  Vitals:    05/13/23 1057   BP: 146/93   Pulse: 70       Physical Exam  Vitals and nursing note reviewed  Constitutional:       General: He is not in acute distress  Appearance: He is well-developed  He is not diaphoretic  HENT:      Head: Normocephalic and atraumatic  Right Ear: External ear normal       Left Ear: External ear normal       Nose: Nose normal    Eyes:      General: Lids are normal  No scleral icterus  Cardiovascular:      Rate and Rhythm: Normal rate and regular rhythm  Pulmonary:      Effort: Pulmonary effort is normal  No respiratory distress  Musculoskeletal:         General: No deformity  Normal range of motion  Cervical back: Normal range of motion and neck supple  Skin:     General: Skin is warm and dry  Comments: There is hypopigmented lesions along patient's beard line and within his beard  There are also scattered hypopigmented areas near the eyebrows and near the front hairline  Neurological:      General: No focal deficit present  Mental Status: He is alert     Psychiatric:         Mood and Affect: Mood normal          Behavior: Behavior normal          ED Medications  Medications   diphenhydrAMINE (BENADRYL) tablet 25 mg (25 mg Oral Given 5/13/23 1130)   predniSONE tablet 60 mg (60 mg Oral Given 5/13/23 1130)       Diagnostic Studies  Results Reviewed     None                 No orders to display         Procedures  Procedures      ED "Course                                       Medical Decision Making  Patient is a 47 y o  male who presents to the ED for an itchy rash to his face  Patient is nontoxic, well-appearing  Vitals are stable  On exam he has hypopigmented areas corresponding with the itchy rash  Clinical impression is fungal infection/tinea versicolor  As there is no indication for further work-up or treatment in the emergency department will discharge  Prescription for ketoconazole sent to pharmacy  Recommended dermatology follow-up  Return precautions discussed  Patient verbalized understanding and agreed to plan of care  Portions of the record may have been created with voice recognition software  Occasional wrong word or \"sound a like\" substitutions may have occurred due to the inherent limitations of voice recognition software  Read the chart carefully and recognize, using context, where substitutions have occurred  Tinea versicolor: acute illness or injury  Risk  OTC drugs  Prescription drug management  Disposition  Final diagnoses:   Tinea versicolor     Time reflects when diagnosis was documented in both MDM as applicable and the Disposition within this note     Time User Action Codes Description Comment    5/13/2023 11:28 AM Nedra Brennan Add [B36 0] Tinea versicolor       ED Disposition     ED Disposition   Discharge    Condition   Stable    Date/Time   Sat May 13, 2023 11:28 AM    Comment   Carla Fernandez discharge to home/self care                 Follow-up Information     Follow up With Specialties Details Why Contact Info Additional Information    Jignesh Monge MD [facility] Medicine   [address] [place] [address] Emergency Department Emergency Medicine  As needed [address] [id number]  [address] [place] [address] Emergency Department, [address], " [id number]   [phone]          Discharge Medication List as of 5/13/2023 11:32 AM      START taking these medications    Details   ketoconazole (NIZORAL) 2 % cream Apply topically daily at bedtime for 14 days To the affected areas, Starting Sat 5/13/2023, Until Sat 5/27/2023, Normal      !! predniSONE 20 mg tablet Take 2 tablets (40 mg total) by mouth daily for 4 days Do not start before May 14, 2023 , Starting Sun 5/14/2023, Until Thu 5/18/2023, Normal       !! - Potential duplicate medications found  Please discuss with provider        CONTINUE these medications which have NOT CHANGED    Details   acetaminophen (TYLENOL) 500 mg tablet Take 500 mg by mouth every 6 (six) hours as needed, Historical Med      albuterol (PROVENTIL HFA,VENTOLIN HFA) 90 mcg/act inhaler Inhale 2 puffs every 6 (six) hours as needed for wheezing, Starting Tue 11/9/2021, Normal      diphenhydrAMINE (BENADRYL) 50 mg capsule as needed , Starting Tue 9/14/2021, Historical Med      Dulera 200-5 MCG/ACT inhaler INHALE 2 PUFFS BY MOUTH 2 TIMES A DAY RINSE MOUTH AFTER USE , Normal      fluticasone-salmeterol (Advair) 250-50 mcg/dose inhaler Inhale 1 puff as needed , Historical Med      hydrOXYzine HCL (ATARAX) 25 mg tablet TAKE 1 TABLET (25 MG TOTAL) BY MOUTH EVERY 6 (SIX) HOURS AS NEEDED (AS NEEDED), Starting Fri 4/14/2023, Normal      losartan (COZAAR) 25 mg tablet 1/2 tablet q day, Normal      metoprolol succinate (TOPROL-XL) 25 mg 24 hr tablet Take 1 tablet (25 mg total) by mouth daily, Starting Thu 12/8/2022, Normal      Multiple Vitamins-Minerals (MULTIVITAMIN GUMMIES MENS PO) Take by mouth daily, Historical Med      ondansetron (ZOFRAN-ODT) 4 mg disintegrating tablet Starting Thu 12/2/2021, Historical Med      pantoprazole (PROTONIX) 40 mg tablet Take 40 mg by mouth 2 (two) times a day with meals, Starting Mon 7/27/2020, Historical Med      !! predniSONE 50 mg tablet as needed , Starting Tue 9/14/2021, Historical Med      sildenafil (REVATIO) 20 mg tablet Take 1 tablet (20 mg total) by mouth 3 (three) times a day, Starting Tue 9/10/2019, Print      triamcinolone (KENALOG) 0 5 % cream Apply topically 2 (two) times a day, Starting Thu 5/20/2021, Normal       !! - Potential duplicate medications found  Please discuss with provider  No discharge procedures on file  PDMP Review     None           ED Provider  Attending physically available and evaluated Mathew Holland I managed the patient along with the ED Attending      Electronically Signed by         Autumn Piña DO  05/13/23 3775

## 2023-05-13 NOTE — DISCHARGE INSTRUCTIONS
You were evaluated in the Emergency Department today for a rash  Your evaluation suggests your symptoms are most likely due to a fungal infection    Please follow up with a dermatologist as soon as possible  Call to schedule an appointment  A prescription for steroids and an antifungal were sent to your pharmacy  Please take as prescribed  Return to the Emergency Department if you experience worsening or spreading rash, worsening or uncontrolled pain, fevers 100 4°F or greater, recurrent vomiting, shortness of breath, discharge from your rash, or any other concerning symptoms 
denies pain/discomfort

## 2023-05-18 NOTE — ED ATTENDING ATTESTATION
5/13/2023  I, Sita Huerta MD, saw and evaluated the patient  I have discussed the patient with the resident/non-physician practitioner and agree with the resident's/non-physician practitioner's findings, Plan of Care, and MDM as documented in the resident's/non-physician practitioner's note, except where noted  All available labs and Radiology studies were reviewed  I was present for key portions of any procedure(s) performed by the resident/non-physician practitioner and I was immediately available to provide assistance  At this point I agree with the current assessment done in the Emergency Department  I have conducted an independent evaluation of this patient a history and physical is as follows:    ED Course      Patient is a 21-year-old male who presents with a pruritic rash around his beard hairline and eyebrows started yesterday  Denies any new lotions or soaps or other triggers  Denies any new symptoms  No mucocutaneous involvement  Examination shows area of white patch like lesions along patient's beard line and with within the hair line as well  Few scattered areas located over her eyebrows and near from the forehead  Minor hair loss noted      Impression skin rash  Differential diagnosis: Possible fungal infection tinea versicolor    Plan to treat patient's symptoms with steroids antihistamines as well as ketoconazole follow-up with dermatology as outpatient        Critical Care Time  Procedures

## 2023-06-13 ENCOUNTER — TELEPHONE (OUTPATIENT)
Dept: DERMATOLOGY | Facility: CLINIC | Age: 53
End: 2023-06-13

## 2023-06-13 NOTE — TELEPHONE ENCOUNTER
Received call from patient to schedule NP appt  Pt was seen in the ER a few weeks ago and said that he was referred to Dermatology  No referral in chart  Advised pt to contact his PCP for an appt/referral, as we are scheduling out towards the end of the year for new patients

## 2023-06-15 ENCOUNTER — OFFICE VISIT (OUTPATIENT)
Dept: FAMILY MEDICINE CLINIC | Facility: CLINIC | Age: 53
End: 2023-06-15
Payer: COMMERCIAL

## 2023-06-15 VITALS
OXYGEN SATURATION: 97 % | RESPIRATION RATE: 18 BRPM | WEIGHT: 177.38 LBS | BODY MASS INDEX: 25.39 KG/M2 | SYSTOLIC BLOOD PRESSURE: 140 MMHG | TEMPERATURE: 98 F | DIASTOLIC BLOOD PRESSURE: 89 MMHG | HEART RATE: 66 BPM | HEIGHT: 70 IN

## 2023-06-15 DIAGNOSIS — D50.9 IRON DEFICIENCY ANEMIA, UNSPECIFIED IRON DEFICIENCY ANEMIA TYPE: Primary | ICD-10-CM

## 2023-06-15 DIAGNOSIS — L30.9 DERMATITIS: ICD-10-CM

## 2023-06-15 PROCEDURE — 99214 OFFICE O/P EST MOD 30 MIN: CPT | Performed by: FAMILY MEDICINE

## 2023-06-15 RX ORDER — FLUCONAZOLE 150 MG/1
TABLET ORAL
Qty: 14 TABLET | Refills: 0 | Status: SHIPPED | OUTPATIENT
Start: 2023-06-15 | End: 2023-06-29

## 2023-06-15 RX ORDER — KETOCONAZOLE 20 MG/G
CREAM TOPICAL 2 TIMES DAILY
Qty: 60 G | Refills: 1 | Status: SHIPPED | OUTPATIENT
Start: 2023-06-15

## 2023-06-15 RX ORDER — METOPROLOL SUCCINATE 25 MG/1
25 TABLET, EXTENDED RELEASE ORAL DAILY
Qty: 90 TABLET | Refills: 1 | Status: SHIPPED | OUTPATIENT
Start: 2023-06-15
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
## 2023-06-16 NOTE — ASSESSMENT & PLAN NOTE
Dermatitis  Patient with pruritic hypopigmented skin  Tinea versicolor versus lichen planus? Patient given prescription for Nizoral cream to apply twice daily to the affected area as well as Diflucan 150 mg to use once daily for 2 weeks  Patient will obtain blood work as ordered for further evaluation    We will make further recommendations pending results of test

## 2023-06-19 ENCOUNTER — TELEPHONE (OUTPATIENT)
Dept: OTHER | Facility: OTHER | Age: 53
End: 2023-06-19

## 2023-06-19 ENCOUNTER — OFFICE VISIT (OUTPATIENT)
Dept: DERMATOLOGY | Facility: CLINIC | Age: 53
End: 2023-06-19
Payer: COMMERCIAL

## 2023-06-19 VITALS — BODY MASS INDEX: 24.36 KG/M2 | TEMPERATURE: 98.2 F | HEIGHT: 71 IN | WEIGHT: 174 LBS

## 2023-06-19 DIAGNOSIS — L30.2 ID REACTION: ICD-10-CM

## 2023-06-19 DIAGNOSIS — B35.0 TINEA CAPITIS: Primary | ICD-10-CM

## 2023-06-19 PROCEDURE — 99244 OFF/OP CNSLTJ NEW/EST MOD 40: CPT | Performed by: STUDENT IN AN ORGANIZED HEALTH CARE EDUCATION/TRAINING PROGRAM

## 2023-06-19 NOTE — TELEPHONE ENCOUNTER
Patient called in post OV today to inquire about new prescription for hydrocortisone 2 5% cream twice a day for up to 10 days, then as needed that was not at the pharmacy today  Please follow up with patient to confirm if sending in prescription

## 2023-06-19 NOTE — PROGRESS NOTES
[facility] Note     Patient Name: Jewell Ruvalcaba  Encounter Date: 6/19/2023     Have you been cared for by a [facility] in the last 3 years and, if so, which description applies to you? NO. I am considered a "new" patient and must complete all patient intake questions. I am MALE/not capable of bearing children. REVIEW OF SYSTEMS:  Have you recently had or currently have any of the following? · Recent fever or chills? No  · Any non-healing wound? No   PAST MEDICAL HISTORY:  Have you personally ever had or currently have any of the following? If "YES," then please provide more detail. · Skin cancer (such as Melanoma, Basal Cell Carcinoma, Squamous Cell Carcinoma? No  · Tuberculosis, HIV/AIDS, Hepatitis B or C: No  · Systemic Immunosuppression such as Diabetes, Biologic or Immunotherapy, Chemotherapy, Organ Transplantation, Bone Marrow Transplantation No  · Radiation Treatment No   FAMILY HISTORY:  Any "first degree relatives" (parent, brother, sister, or child) with the following? • Skin Cancer, Pancreatic or Other Cancer? No   PATIENT EXPERIENCE:    • Do you want the Dermatologist to perform a COMPLETE skin exam today including a clinical examination under the "bra and underwear" areas? NO  • If necessary, do we have your permission to call and leave a detailed message on your Preferred Phone number that includes your specific medical information?   Yes      Allergies   Allergen Reactions   • Aminophylline Rash and Throat Swelling   • Theophylline Rash and Throat Swelling   • Salmeterol Hives, Other (See Comments) and Vomiting     HIVEs vomiting   • Pollen Extract Other (See Comments)     Nasal congestion - triggers asthma   • Iodine - Food Allergy Rash   • Penicillins Rash   • Shellfish-Derived Products - Food Allergy Rash      Current Outpatient Medications:   •  acetaminophen (TYLENOL) 500 mg tablet, Take 500 mg by mouth every 6 (six) hours as needed, Disp: , Rfl:   • albuterol (PROVENTIL HFA,VENTOLIN HFA) 90 mcg/act inhaler, Inhale 2 puffs every 6 (six) hours as needed for wheezing, Disp: 18 g, Rfl: 5  •  diphenhydrAMINE (BENADRYL) 50 mg capsule, as needed , Disp: , Rfl:   •  Dulera 200-5 MCG/ACT inhaler, INHALE 2 PUFFS BY MOUTH 2 TIMES A DAY RINSE MOUTH AFTER USE., Disp: 13 g, Rfl: 2  •  fluconazole (DIFLUCAN) 150 mg tablet, One po q day, Disp: 14 tablet, Rfl: 0  •  fluticasone-salmeterol (Advair) 250-50 mcg/dose inhaler, Inhale 1 puff as needed , Disp: , Rfl:   •  hydrOXYzine HCL (ATARAX) 25 mg tablet, TAKE 1 TABLET (25 MG TOTAL) BY MOUTH EVERY 6 (SIX) HOURS AS NEEDED (AS NEEDED), Disp: 360 tablet, Rfl: 1  •  ketoconazole (NIZORAL) 2 % cream, Apply topically 2 (two) times a day, Disp: 60 g, Rfl: 1  •  losartan (COZAAR) 25 mg tablet, 1/2 tablet q day, Disp: 45 tablet, Rfl: 1  •  metoprolol succinate (TOPROL-XL) 25 mg 24 hr tablet, Take 1 tablet (25 mg total) by mouth daily, Disp: 90 tablet, Rfl: 1  •  Multiple Vitamins-Minerals (MULTIVITAMIN GUMMIES MENS PO), Take by mouth daily, Disp: , Rfl:   •  ondansetron (ZOFRAN-ODT) 4 mg disintegrating tablet, , Disp: , Rfl:   •  pantoprazole (PROTONIX) 40 mg tablet, Take 40 mg by mouth 2 (two) times a day with meals, Disp: , Rfl:   •  ketoconazole (NIZORAL) 2 % cream, Apply topically daily at bedtime for 14 days To the affected areas, Disp: 15 g, Rfl: 0  •  predniSONE 50 mg tablet, as needed  (Patient not taking: Reported on 1/10/2022), Disp: , Rfl:   •  sildenafil (REVATIO) 20 mg tablet, Take 1 tablet (20 mg total) by mouth 3 (three) times a day (Patient not taking: Reported on 10/13/2021), Disp: 30 tablet, Rfl: 5  •  triamcinolone (KENALOG) 0.5 % cream, Apply topically 2 (two) times a day (Patient not taking: Reported on 6/19/2023), Disp: 30 g, Rfl: 3          • Whom besides the patient is providing clinical information about today's encounter?   o NO ADDITIONAL HISTORIAN (patient alone provided history)    Physical Exam and Assessment/Plan by Diagnosis:    1. TINEA CAPITIS    Physical Exam:  • Anatomic Location Affected:  Scalp  • Morphological Description:  Along frontal hairline, round areas, slightly erythematous skin with broken hairs under dermoscopy. No significant scaling for scraping today, likely in the setting of partial treatment    Additional History of Present Condition:  Present for 2 weeks    Assessment and Plan:  Based on a thorough discussion of this condition and the management approach to it (including a comprehensive discussion of the known risks, side effects and potential benefits of treatment), the patient (family) agrees to implement the following specific plan:  • Continue fluconazole 150 mg daily as prescribed given already in the midst of treatment course. . Discussed with patient that this is generally second line treatment with griseofulvin and terbinafine more frequently utilized but with data suggesting efficacy of fluconazole. • Continue ketoconazole cream twice a day as it will decrease the risk of spreading infection to other (patient prefers over shampoo)      2. ID REACTION    Physical Exam:  • Anatomic Location Affected: Face  • Morphological Description:  Edematous plaques    Additional History of Present Condition:  Present for 2 weeks    Assessment and Plan:  - Favor ID reaction in the setting of tinea infection. Based on a thorough discussion of this condition and the management approach to it (including a comprehensive discussion of the known risks, side effects and potential benefits of treatment), the patient (family) agrees to implement the following specific plan:  • Begin hydrocortisone 2.5% cream twice a day for up to 10 days, then as needed. Educated on side effects of steroids. Educated not to apply to areas of tinea infection.     Scribe Attestation    I,:  Errol Murphy MA am acting as a scribe while in the presence of the attending physician.:       I,:  Rachel Oseguera MD personally performed the services described in this documentation    as scribed in my presence.:

## 2023-06-19 NOTE — PATIENT INSTRUCTIONS
1. TINEA CAPITIS    Physical Exam:  Anatomic Location Affected:  Scalp    Assessment and Plan:  Based on a thorough discussion of this condition and the management approach to it (including a comprehensive discussion of the known risks, side effects and potential benefits of treatment), the patient (family) agrees to implement the following specific plan:  Continue fluconazole 150 mg daily  Continue ketoconazole cream twice a day      2. ID REACTION    Physical Exam:  Anatomic Location Affected:   Face    Assessment and Plan:  Based on a thorough discussion of this condition and the management approach to it (including a comprehensive discussion of the known risks, side effects and potential benefits of treatment), the patient (family) agrees to implement the following specific plan:  Begin hydrocortisone 2.5% cream twice a day for up to 10 days, then as needed

## 2023-06-20 ENCOUNTER — TELEPHONE (OUTPATIENT)
Dept: DERMATOLOGY | Facility: CLINIC | Age: 53
End: 2023-06-20

## 2023-06-20 NOTE — TELEPHONE ENCOUNTER
Dennis Neff, prescription sent! Think he went prior to me sending all orders at the end of clinic but should have been there around 5 pm  Thank you!

## 2023-08-14 ENCOUNTER — APPOINTMENT (EMERGENCY)
Dept: RADIOLOGY | Facility: HOSPITAL | Age: 53
End: 2023-08-14
Payer: COMMERCIAL

## 2023-08-14 ENCOUNTER — HOSPITAL ENCOUNTER (EMERGENCY)
Facility: HOSPITAL | Age: 53
Discharge: HOME | End: 2023-08-14
Attending: EMERGENCY MEDICINE
Payer: COMMERCIAL

## 2023-08-14 VITALS
SYSTOLIC BLOOD PRESSURE: 148 MMHG | DIASTOLIC BLOOD PRESSURE: 108 MMHG | TEMPERATURE: 98.3 F | HEART RATE: 69 BPM | RESPIRATION RATE: 18 BRPM | OXYGEN SATURATION: 99 %

## 2023-08-14 DIAGNOSIS — M25.551 RIGHT HIP PAIN: ICD-10-CM

## 2023-08-14 DIAGNOSIS — M25.561 RIGHT KNEE PAIN: Primary | ICD-10-CM

## 2023-08-14 PROCEDURE — 73564 X-RAY EXAM KNEE 4 OR MORE: CPT

## 2023-08-14 PROCEDURE — 99284 EMERGENCY DEPT VISIT MOD MDM: CPT | Performed by: EMERGENCY MEDICINE

## 2023-08-14 PROCEDURE — 73502 X-RAY EXAM HIP UNI 2-3 VIEWS: CPT

## 2023-08-14 PROCEDURE — 99283 EMERGENCY DEPT VISIT LOW MDM: CPT

## 2023-08-14 RX ORDER — LIDOCAINE 50 MG/G
1 PATCH TOPICAL ONCE
Status: DISCONTINUED | OUTPATIENT
Start: 2023-08-14 | End: 2023-08-14

## 2023-08-14 RX ORDER — LIDOCAINE 50 MG/G
1 PATCH TOPICAL ONCE
Status: DISCONTINUED | OUTPATIENT
Start: 2023-08-14 | End: 2023-08-14 | Stop reason: HOSPADM

## 2023-08-14 RX ORDER — NAPROXEN 500 MG/1
500 TABLET ORAL ONCE
Status: COMPLETED | OUTPATIENT
Start: 2023-08-14 | End: 2023-08-14

## 2023-08-14 RX ORDER — NAPROXEN 500 MG/1
500 TABLET ORAL 2 TIMES DAILY WITH MEALS
Qty: 20 TABLET | Refills: 0 | Status: SHIPPED | OUTPATIENT
Start: 2023-08-14

## 2023-08-14 RX ADMIN — NAPROXEN 500 MG: 500 TABLET ORAL at 14:36

## 2023-08-14 RX ADMIN — LIDOCAINE 5% 1 PATCH: 700 PATCH TOPICAL at 15:32

## 2023-08-14 NOTE — ED ATTENDING ATTESTATION
8/14/2023  I, Contreras Amato MD, saw and evaluated the patient. I have discussed the patient with the resident/non-physician practitioner and agree with the resident's/non-physician practitioner's findings, Plan of Care, and MDM as documented in the resident's/non-physician practitioner's note, except where noted. All available labs and Radiology studies were reviewed. I was present for key portions of any procedure(s) performed by the resident/non-physician practitioner and I was immediately available to provide assistance. At this point I agree with the current assessment done in the Emergency Department. I have conducted an independent evaluation of this patient a history and physical is as follows:    Chief Complaint   Patient presents with   • Leg Pain     Pt reports last week he was walking downstairs in socks and slipped, caught himself but heard a pop, pain in R hip to R knee, swelling at knee. Able to ambulate independently.        ED Course         Critical Care Time  Procedures right injury   Final Result      No acute osseous abnormality. Workstation performed: [id number]         XR hip/pelv 2-3 vws right if performed   Final Result      No acute osseous abnormality. Workstation performed: [id number]           56-year-old male presenting with right lower extremity injury 5 days ago. Vital signs reviewed, hypertensive, within normal limits otherwise. Differential diagnosis includes strain, sprain, with other etiologies such as fracture or dislocation considered but deemed less likely. X-rays of right hip and right knee obtained, to my interpretation, no acute fracture or dislocation. At this time, recommend continuing with Tylenol and Motrin, recommend continuing with stretching, and recommend close follow-up with orthopedics for further evaluation.

## 2023-08-14 NOTE — ED ATTENDING ATTESTATION
8/14/2023  I, Emma Silver MD, saw and evaluated the patient. I have discussed the patient with the resident/non-physician practitioner and agree with the resident's/non-physician practitioner's findings, Plan of Care, and MDM as documented in the resident's/non-physician practitioner's note, except where noted. All available labs and Radiology studies were reviewed. I was present for key portions of any procedure(s) performed by the resident/non-physician practitioner and I was immediately available to provide assistance. At this point I agree with the current assessment done in the Emergency Department. I have conducted an independent evaluation of this patient a history and physical is as follows:    Chief Complaint   Patient presents with   • Leg Pain     Pt reports last week he was walking downstairs in socks and slipped, caught himself but heard a pop, pain in R hip to R knee, swelling at knee. Able to ambulate independently.        ED Course         Critical Care Time  Procedures

## 2023-08-14 NOTE — ED PROVIDER NOTES
History  Chief Complaint   Patient presents with   • Leg Pain     Pt reports last week he was walking downstairs in socks and slipped, caught himself but heard a pop, pain in R hip to R knee, swelling at knee. Able to ambulate independently. 46 yo M no significant PMhx presents to the ED complaining of  R knee pain. Patient tells me that he fell down 5 steps one week ago. [place] Billing onto his R side, stood up and felt pop in his knee. Since then, patient has been walking on the injury and has had waxing and waning pain. He is on his feet at work but does not do much kneeling or bending. He did go to the gym the day following the injury specifically to exercise and stretch out his knee. He has developed worsening pain with every day since the original injury. Pain is worse at night. He has tried both Tylenol and Motrin, he has tried exercising. He denies any weakness or numbness to the right lower extremity. He denies any fevers chills no systemic symptoms. He has no swelling or redness. He has very mild pain at the right hip which is lateral and only with certain positions. Prior to Admission Medications   Prescriptions Last Dose Informant Patient Reported? Taking? Dulera 200-5 MCG/ACT inhaler   No No   Sig: INHALE 2 PUFFS BY MOUTH 2 TIMES A DAY RINSE MOUTH AFTER USE.    Multiple Vitamins-Minerals (MULTIVITAMIN GUMMIES MENS PO)   Yes No   Sig: Take by mouth daily   acetaminophen (TYLENOL) 500 mg tablet   Yes No   Sig: Take 500 mg by mouth every 6 (six) hours as needed   albuterol (PROVENTIL HFA,VENTOLIN HFA) 90 mcg/act inhaler   No No   Sig: Inhale 2 puffs every 6 (six) hours as needed for wheezing   diphenhydrAMINE (BENADRYL) 50 mg capsule   Yes No   Sig: as needed    fluticasone-salmeterol (Advair) 250-50 mcg/dose inhaler   Yes No   Sig: Inhale 1 puff as needed    hydrOXYzine HCL (ATARAX) 25 mg tablet   No No   Sig: TAKE 1 TABLET (25 MG TOTAL) BY MOUTH EVERY 6 (SIX) HOURS AS NEEDED (AS NEEDED) hydrocortisone 2.5 % cream   No No   Sig: Apply topically twice daily for up to 10 days. May repeat course if needed after 7 day break.  DO NOT apply to areas of involvement along hair line.   ketoconazole (NIZORAL) 2 % cream   No No   Sig: Apply topically daily at bedtime for 14 days To the affected areas   ketoconazole (NIZORAL) 2 % cream   No No   Sig: Apply topically 2 (two) times a day   losartan (COZAAR) 25 mg tablet   No No   Si/2 tablet q day   metoprolol succinate (TOPROL-XL) 25 mg 24 hr tablet   No No   Sig: Take 1 tablet (25 mg total) by mouth daily   ondansetron (ZOFRAN-ODT) 4 mg disintegrating tablet   Yes No   pantoprazole (PROTONIX) 40 mg tablet   Yes No   Sig: Take 40 mg by mouth 2 (two) times a day with meals   predniSONE 50 mg tablet   Yes No   Sig: as needed    Patient not taking: Reported on 1/10/2022   sildenafil (REVATIO) 20 mg tablet   No No   Sig: Take 1 tablet (20 mg total) by mouth 3 (three) times a day   Patient not taking: Reported on 10/13/2021   triamcinolone (KENALOG) 0.5 % cream   No No   Sig: Apply topically 2 (two) times a day   Patient not taking: Reported on 2023      Facility-Administered Medications: None       Past Medical History:   Diagnosis Date   • Allergic rhinitis    • Anxiety    • Asthma    • Eczema    • Erectile dysfunction    • GERD (gastroesophageal reflux disease)    • History of transfusion     21 Pt states received blood when hospitalized over esophageal bleeding - no adverse reactions to blood tolerated transfusion well    • Left inguinal hernia    • Myocardial infarction [facility])     pt was told he had an infarct /documents indicate non ST elevation -sees cardiology at [facility]- had recent ECHO and stress test in 2021   • Peptic ulceration 2020    esophageal bleed   • Pneumonia    • Tachycardia     hx of SVT       Past Surgical History:   Procedure Laterality Date   • EGD AND COLONOSCOPY      2020   • NV LAPAROSCOPY SURG RPR INITIAL INGUINAL HERNIA Left 7/22/2021    Procedure: REPAIR HERNIA INGUINAL ROBOTIC WITH MESH;  Surgeon: Guevara Diallo MD;  Location: [facility];  Service: General   • WISDOM TOOTH EXTRACTION         Family History   Problem Relation Age of Onset   • Asthma Mother    • Diabetes Father      I have reviewed and agree with the history as documented. E-Cigarette/Vaping   • E-Cigarette Use Never User      E-Cigarette/Vaping Substances   • Nicotine No    • THC No      Social History     Tobacco Use   • Smoking status: Never   • Smokeless tobacco: Never   Vaping Use   • Vaping Use: Never used   Substance Use Topics   • Alcohol use: Yes     Comment: Occasional -once a week   • Drug use: Never        Review of Systems   Constitutional: Negative for chills and fever. HENT: Negative for ear pain and sore throat. Eyes: Negative for pain and visual disturbance. Respiratory: Negative for cough and shortness of breath. Cardiovascular: Negative for chest pain and palpitations. Gastrointestinal: Negative for abdominal pain and vomiting. Genitourinary: Negative for dysuria and hematuria. Musculoskeletal: Positive for arthralgias. Negative for back pain and myalgias. Skin: Negative for color change and rash. Neurological: Negative for seizures and syncope. All other systems reviewed and are negative. Physical Exam  ED Triage Vitals [08/14/23 1229]   Temperature Pulse Respirations Blood Pressure SpO2   98.3 °F (36.8 °C) 69 18 (!) 148/108 99 %      Temp Source Heart Rate Source Patient Position - Orthostatic VS BP Location FiO2 (%)   Temporal Monitor Sitting Left arm --      Pain Score       10 - Worst Possible Pain             Orthostatic Vital Signs  Vitals:    08/14/23 1229   BP: (!) 148/108   Pulse: 69   Patient Position - Orthostatic VS: Sitting       Physical Exam  Vitals and nursing note reviewed. Constitutional:       General: He is not in acute distress. Appearance: He is well-developed.    HENT:      Head: Normocephalic and atraumatic. Eyes:      Conjunctiva/sclera: Conjunctivae normal.   Cardiovascular:      Rate and Rhythm: Normal rate and regular rhythm. Heart sounds: No murmur heard. Pulmonary:      Effort: Pulmonary effort is normal. No respiratory distress. Breath sounds: Normal breath sounds. Abdominal:      Palpations: Abdomen is soft. Tenderness: There is no abdominal tenderness. Musculoskeletal:         General: No swelling. Cervical back: Neck supple. Legs:       Comments: Tenderness to palpation of the right lateral knee. Very mild tenderness to palpation of the right lateral hip. Range of motion at the hip and the knee intact. No laxity at the knee. No effusion at the knee no overlying redness warmth. Anterior and posterior drawer test negative. Skin:     General: Skin is warm and dry. Capillary Refill: Capillary refill takes less than 2 seconds. Neurological:      Mental Status: He is alert. Psychiatric:         Mood and Affect: Mood normal.         ED Medications  Medications   naproxen (NAPROSYN) tablet 500 mg (500 mg Oral Given 8/14/23 1436)       Diagnostic Studies  Results Reviewed     None                 XR knee 4+ vw right injury   Final Result by Hemalatha Gauthier MD (08/14 1611)      No acute osseous abnormality. Workstation performed: [id number]         XR hip/pelv 2-3 vws right if performed   Final Result by Hemalatha Gauthier MD (08/14 1611)      No acute osseous abnormality. Workstation performed: [id number]               Procedures  Procedures      ED Course                             SBIRT 22yo+    Flowsheet Row Most Recent Value   Initial Alcohol Screen: US AUDIT-C     1. How often do you have a drink containing alcohol? 2 Filed at: 08/14/2023 1232   2. How many drinks containing alcohol do you have on a typical day you are drinking? 2 Filed at: 08/14/2023 1232   3a. Male UNDER 65:  How often do you have five or more drinks on one occasion? 1 Filed at: 08/14/2023 1232   Audit-C Score 5 Filed at: 08/14/2023 1232   ANIBAL: How many times in the past year have you. .. Used an illegal drug or used a prescription medication for non-medical reasons? Never Filed at: 08/14/2023 1232                Medical Decision Making  48 yo M no significant PMhx presents to the ED complaining of  R knee pain. DDx: Lateral collateral ligament versus meniscal tear/sprain of the right knee, fracture of the right hip (unlikely considering 1 week of injury mild pain). X-rays performed of the knee and the hip. Unremarkable for osseous injury. Recommending continued conservative management with Tylenol/Motrin, ice, rest and gentle exercise. Ambulatory referral placed to Ortho for follow-up. Patient appreciable and understanding, discharged home in stable condition to follow-up with Ortho outpatient. Amount and/or Complexity of Data Reviewed  Radiology: ordered. Risk  Prescription drug management. Disposition  Final diagnoses:   Right knee pain   Right hip pain     Time reflects when diagnosis was documented in both MDM as applicable and the Disposition within this note     Time User Action Codes Description Comment    8/14/2023  3:32 PM Naseem Cool Add [S51.019] Right knee pain     8/14/2023  3:32 PM Hong, [facility] Right hip pain       ED Disposition     ED Disposition   Discharge    Condition   Stable    Date/Time   Mon Aug 14, 2023  3:33 PM    Comment   Basil Parrish discharge to home/self care.                Follow-up Information     Follow up With Specialties Details Why Contact Info Additional [address] [facility] Schedule an appointment as soon as possible for a visit   [address] [facility], [address] [place], [place], [address] [phone]  Use Entrance A           Discharge Medication List as of 8/14/2023  3:38 PM      START taking these medications    Details   naproxen (Naprosyn) 500 mg tablet Take 1 tablet (500 mg total) by mouth 2 (two) times a day with meals Do not take this medication with Advil/Motrin/NSAID medication. You may take with Tylenol., Starting Mon 8/14/2023, Normal         CONTINUE these medications which have NOT CHANGED    Details   acetaminophen (TYLENOL) 500 mg tablet Take 500 mg by mouth every 6 (six) hours as needed, Historical Med      albuterol (PROVENTIL HFA,VENTOLIN HFA) 90 mcg/act inhaler Inhale 2 puffs every 6 (six) hours as needed for wheezing, Starting Tue 11/9/2021, Normal      diphenhydrAMINE (BENADRYL) 50 mg capsule as needed , Starting Tue 9/14/2021, Historical Med      Dulera 200-5 MCG/ACT inhaler INHALE 2 PUFFS BY MOUTH 2 TIMES A DAY RINSE MOUTH AFTER USE., Normal      fluticasone-salmeterol (Advair) 250-50 mcg/dose inhaler Inhale 1 puff as needed , Historical Med      hydrocortisone 2.5 % cream Apply topically twice daily for up to 10 days. May repeat course if needed after 7 day break.  DO NOT apply to areas of involvement along hair line., Normal      hydrOXYzine HCL (ATARAX) 25 mg tablet TAKE 1 TABLET (25 MG TOTAL) BY MOUTH EVERY 6 (SIX) HOURS AS NEEDED (AS NEEDED), Starting Fri 4/14/2023, Normal      ketoconazole (NIZORAL) 2 % cream Apply topically 2 (two) times a day, Starting Thu 6/15/2023, Normal      losartan (COZAAR) 25 mg tablet 1/2 tablet q day, Normal      metoprolol succinate (TOPROL-XL) 25 mg 24 hr tablet Take 1 tablet (25 mg total) by mouth daily, Starting Thu 6/15/2023, Normal      Multiple Vitamins-Minerals (MULTIVITAMIN GUMMIES MENS PO) Take by mouth daily, Historical Med      ondansetron (ZOFRAN-ODT) 4 mg disintegrating tablet Starting Thu 12/2/2021, Historical Med      pantoprazole (PROTONIX) 40 mg tablet Take 40 mg by mouth 2 (two) times a day with meals, Starting Mon 7/27/2020, Historical Med      predniSONE 50 mg tablet as needed , Starting Tue 9/14/2021, Historical Med      sildenafil (REVATIO) 20 mg tablet Take 1 tablet (20 mg total) by mouth 3 (three) times a day, Starting Tue 9/10/2019, Print      triamcinolone (KENALOG) 0.5 % cream Apply topically 2 (two) times a day, Starting Thu 5/20/2021, Normal               PDMP Review     None           ED Provider  Attending physically available and evaluated Sree Jenkins. I managed the patient along with the ED Attending.     Electronically Signed by         Jin Corrigan MD  08/16/23 9710

## 2023-08-14 NOTE — Clinical Note
Noah Mathur was seen and treated in our emergency department on 8/14/2023. No restrictions            Diagnosis:     Porfirio Glover  may return to work on return date, is off the rest of the shift today. He may return on this date: 08/15/2023         If you have any questions or concerns, please don't hesitate to call.       Tita Bose MD    ______________________________           _______________          _______________  Hospital Representative                              Date                                Time

## 2023-10-20 ENCOUNTER — OFFICE VISIT (OUTPATIENT)
Dept: FAMILY MEDICINE CLINIC | Facility: CLINIC | Age: 53
End: 2023-10-20
Payer: COMMERCIAL

## 2023-10-20 VITALS
HEART RATE: 78 BPM | BODY MASS INDEX: 25.41 KG/M2 | TEMPERATURE: 98.2 F | WEIGHT: 177.5 LBS | HEIGHT: 70 IN | SYSTOLIC BLOOD PRESSURE: 128 MMHG | DIASTOLIC BLOOD PRESSURE: 80 MMHG | OXYGEN SATURATION: 98 % | RESPIRATION RATE: 17 BRPM

## 2023-10-20 DIAGNOSIS — J45.909 MILD ASTHMA WITHOUT COMPLICATION, UNSPECIFIED WHETHER PERSISTENT: ICD-10-CM

## 2023-10-20 DIAGNOSIS — I42.9 CARDIOMYOPATHY, UNSPECIFIED TYPE (HCC): ICD-10-CM

## 2023-10-20 DIAGNOSIS — R07.9 CHEST PAIN, UNSPECIFIED TYPE: ICD-10-CM

## 2023-10-20 DIAGNOSIS — Z23 ENCOUNTER FOR IMMUNIZATION: Primary | ICD-10-CM

## 2023-10-20 PROBLEM — N52.9 ERECTILE DYSFUNCTION: Status: RESOLVED | Noted: 2019-09-10 | Resolved: 2023-10-20

## 2023-10-20 PROCEDURE — 90471 IMMUNIZATION ADMIN: CPT

## 2023-10-20 PROCEDURE — 99214 OFFICE O/P EST MOD 30 MIN: CPT | Performed by: FAMILY MEDICINE

## 2023-10-20 PROCEDURE — 90686 IIV4 VACC NO PRSV 0.5 ML IM: CPT

## 2023-10-20 RX ORDER — MOMETASONE FUROATE AND FORMOTEROL FUMARATE DIHYDRATE 200; 5 UG/1; UG/1
2 AEROSOL RESPIRATORY (INHALATION) 2 TIMES DAILY
Qty: 13 G | Refills: 5 | Status: SHIPPED | OUTPATIENT
Start: 2023-10-20

## 2023-10-20 RX ORDER — LOSARTAN POTASSIUM 25 MG/1
TABLET ORAL
Qty: 45 TABLET | Refills: 3 | Status: SHIPPED | OUTPATIENT
Start: 2023-10-20

## 2023-10-23 NOTE — ASSESSMENT & PLAN NOTE
Cardiomyopathy. Patient requested prescription for CT of heart that was originally prescribed by his cardiologist however patient never proceeded with obtaining this test.  New prescription printed for patient that he will call for appointment.   We will make further recommendations pending results of test.

## 2023-10-23 NOTE — ASSESSMENT & PLAN NOTE
Cardiomyopathy.   Patient was given prescription to proceed with CT scan of his heart which he did not pursue as prescribed by cardiologist in the past.  We will make further recommendations pending results of test.

## 2023-11-02 ENCOUNTER — TELEPHONE (OUTPATIENT)
Dept: FAMILY MEDICINE CLINIC | Facility: CLINIC | Age: 53
End: 2023-11-02

## 2023-11-02 DIAGNOSIS — I42.9 CARDIOMYOPATHY, UNSPECIFIED TYPE (HCC): Primary | ICD-10-CM

## 2023-11-02 NOTE — TELEPHONE ENCOUNTER
Marybel from [facility], pt was scheduled for CT Cardiac w [facility], states order is wrong will have to be changed to Cardiac CTA. This test is also only done there certain days and they are scheduling about 7 weeks out. Test will have to be reordered and then rescheduled.

## 2023-11-07 ENCOUNTER — HOSPITAL ENCOUNTER (OUTPATIENT)
Dept: CT IMAGING | Facility: HOSPITAL | Age: 53
Discharge: HOME | End: 2023-11-07

## 2023-12-06 ENCOUNTER — TELEPHONE (OUTPATIENT)
Dept: FAMILY MEDICINE CLINIC | Facility: CLINIC | Age: 53
End: 2023-12-06

## 2023-12-06 NOTE — TELEPHONE ENCOUNTER
Patient is due to have a CTA Cardiac on 12/28/2023. In his chart it states that he has an allergy to Iodine. She stated that he needs and allergy prep ordered (Medrol 32 mg PO 12 hours, and 2 hours prior to study and the right before study Benadryl 50 mg. She also stated that the patients HR needs to be down to 60 during test,  is it ok to have patient take an extra toprol the day of the test to have his HR where it needs to be.   Please call to advise

## 2023-12-06 NOTE — TELEPHONE ENCOUNTER
This test was recommended by his cardiologist according to pt. I cannot give him advice on his cardiac medications and he would have to call their office for any further questions scheduling dept may have.

## 2023-12-07 ENCOUNTER — OFFICE VISIT (OUTPATIENT)
Dept: FAMILY MEDICINE CLINIC | Facility: CLINIC | Age: 53
End: 2023-12-07
Payer: COMMERCIAL

## 2023-12-07 VITALS
DIASTOLIC BLOOD PRESSURE: 80 MMHG | RESPIRATION RATE: 18 BRPM | SYSTOLIC BLOOD PRESSURE: 132 MMHG | OXYGEN SATURATION: 98 % | TEMPERATURE: 97.8 F | HEART RATE: 68 BPM | HEIGHT: 70 IN | WEIGHT: 177 LBS | BODY MASS INDEX: 25.34 KG/M2

## 2023-12-07 DIAGNOSIS — R07.81 RIB PAIN: ICD-10-CM

## 2023-12-07 DIAGNOSIS — J06.9 UPPER RESPIRATORY TRACT INFECTION, UNSPECIFIED TYPE: Primary | ICD-10-CM

## 2023-12-07 DIAGNOSIS — G47.30 SLEEP APNEA, UNSPECIFIED TYPE: ICD-10-CM

## 2023-12-07 PROCEDURE — 99214 OFFICE O/P EST MOD 30 MIN: CPT | Performed by: FAMILY MEDICINE

## 2023-12-07 RX ORDER — AZITHROMYCIN 250 MG/1
TABLET, FILM COATED ORAL
Qty: 6 TABLET | Refills: 0 | Status: SHIPPED | OUTPATIENT
Start: 2023-12-07 | End: 2023-12-12

## 2023-12-07 RX ORDER — METHOCARBAMOL 750 MG/1
750 TABLET, FILM COATED ORAL 4 TIMES DAILY PRN
COMMUNITY
Start: 2023-12-02

## 2023-12-07 RX ORDER — PREDNISONE 20 MG/1
TABLET ORAL
Qty: 11 TABLET | Refills: 0 | Status: SHIPPED | OUTPATIENT
Start: 2023-12-07

## 2023-12-07 NOTE — ASSESSMENT & PLAN NOTE
Rib pain. Patient with right-sided rib discomfort. This could be muscular due to his exercising or recent URI. He was given prescription for prednisone to take a tapering dose consisting of 40 mg x 3 days, 20 mg x 3 days, 10 mg x 4 days.
Sleep apnea. Patient with symptoms suspicious for sleep apnea. He was given prescription to have a home sleep study performed for further evaluation.   We will make further recommendations pending results of test.
URI.  Patient given prescription for Zithromax Z-Jose to take as directed for 5 days.
ANGE Brasher
MD Santos
burn [id number]
MD Benjamin
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
## 2023-12-23 DIAGNOSIS — D50.9 IRON DEFICIENCY ANEMIA, UNSPECIFIED IRON DEFICIENCY ANEMIA TYPE: ICD-10-CM

## 2023-12-23 RX ORDER — METOPROLOL SUCCINATE 25 MG/1
25 TABLET, EXTENDED RELEASE ORAL DAILY
Qty: 90 TABLET | Refills: 1 | Status: SHIPPED | OUTPATIENT
Start: 2023-12-23

## 2023-12-28 ENCOUNTER — HOSPITAL ENCOUNTER (OUTPATIENT)
Dept: CT IMAGING | Facility: HOSPITAL | Age: 53
Discharge: HOME | End: 2023-12-28
Payer: COMMERCIAL

## 2023-12-28 VITALS
OXYGEN SATURATION: 99 % | RESPIRATION RATE: 20 BRPM | SYSTOLIC BLOOD PRESSURE: 138 MMHG | HEART RATE: 72 BPM | DIASTOLIC BLOOD PRESSURE: 70 MMHG

## 2023-12-28 DIAGNOSIS — I42.9 CARDIOMYOPATHY, UNSPECIFIED TYPE (HCC): ICD-10-CM

## 2023-12-28 PROCEDURE — G1004 CDSM NDSC: HCPCS

## 2023-12-28 PROCEDURE — 75574 CT ANGIO HRT W/3D IMAGE: CPT

## 2023-12-28 RX ORDER — NITROGLYCERIN 0.4 MG/1
0.4 TABLET SUBLINGUAL ONCE
Status: COMPLETED | OUTPATIENT
Start: 2023-12-28 | End: 2023-12-28

## 2023-12-28 RX ORDER — METOPROLOL TARTRATE 1 MG/ML
5 INJECTION, SOLUTION INTRAVENOUS
Status: DISCONTINUED | OUTPATIENT
Start: 2023-12-28 | End: 2023-12-29 | Stop reason: HOSPADM

## 2023-12-28 RX ADMIN — METOPROLOL TARTRATE 5 MG: 1 INJECTION, SOLUTION INTRAVENOUS at 13:24

## 2023-12-28 RX ADMIN — NITROGLYCERIN 0.4 MG: 0.4 TABLET SUBLINGUAL at 13:35

## 2023-12-28 RX ADMIN — METOPROLOL TARTRATE 5 MG: 1 INJECTION, SOLUTION INTRAVENOUS at 13:19

## 2023-12-28 RX ADMIN — IOHEXOL 100 ML: 350 INJECTION, SOLUTION INTRAVENOUS at 13:50

## 2023-12-28 RX ADMIN — METOPROLOL TARTRATE 5 MG: 1 INJECTION, SOLUTION INTRAVENOUS at 13:29

## 2023-12-28 NOTE — NURSING NOTE
Patient arrived for cardiac CT angiography. Test education reviewed with patient. Medications and allergies verified.Pt denies PDE5 inhibitor use. Patient took usual Toprol dose. Anxious. Patient verifies he took alternate allergy prep as instructed. Patient given 5 mg IV metoprolol x 3 doses  to reach target HR . SL nitro given per protocol. See vitals flowsheet. Tolerated test well, no s/s allergic reaction noted. Educated to increase fluid intake remainder of day. Vitals stable and patient asymptomatic upon departure with .

## 2024-01-03 ENCOUNTER — TELEPHONE (OUTPATIENT)
Dept: SLEEP CENTER | Facility: CLINIC | Age: 54
End: 2024-01-03

## 2024-01-08 DIAGNOSIS — G47.30 SLEEP APNEA, UNSPECIFIED TYPE: Primary | ICD-10-CM

## 2024-01-08 NOTE — TELEPHONE ENCOUNTER
Patient's insurance would not cover home sleep study.  Referral placed in epic for patient to make appointment with sleep study physician for consultation.  Please provide phone number

## 2024-01-09 DIAGNOSIS — G47.33 OSA (OBSTRUCTIVE SLEEP APNEA): Primary | ICD-10-CM
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
## 2024-05-02 DIAGNOSIS — D50.9 IRON DEFICIENCY ANEMIA, UNSPECIFIED IRON DEFICIENCY ANEMIA TYPE: ICD-10-CM

## 2024-05-02 RX ORDER — METOPROLOL SUCCINATE 25 MG/1
25 TABLET, EXTENDED RELEASE ORAL DAILY
Qty: 90 TABLET | Refills: 1 | Status: SHIPPED | OUTPATIENT
Start: 2024-05-02

## 2024-08-04 DIAGNOSIS — F41.9 ANXIETY: ICD-10-CM

## 2024-08-05 RX ORDER — HYDROXYZINE HYDROCHLORIDE 25 MG/1
25 TABLET, FILM COATED ORAL EVERY 6 HOURS PRN
Qty: 120 TABLET | Refills: 5 | Status: SHIPPED | OUTPATIENT
Start: 2024-08-05

## 2024-10-18 DIAGNOSIS — J06.9 UPPER RESPIRATORY TRACT INFECTION, UNSPECIFIED TYPE: ICD-10-CM

## 2024-10-18 RX ORDER — PREDNISONE 20 MG/1
TABLET ORAL
Qty: 11 TABLET | Refills: 0 | Status: SHIPPED | OUTPATIENT
Start: 2024-10-18

## 2024-10-26 DIAGNOSIS — D50.9 IRON DEFICIENCY ANEMIA, UNSPECIFIED IRON DEFICIENCY ANEMIA TYPE: ICD-10-CM

## 2024-10-28 RX ORDER — METOPROLOL SUCCINATE 25 MG/1
25 TABLET, EXTENDED RELEASE ORAL DAILY
Qty: 30 TABLET | Refills: 0 | Status: SHIPPED | OUTPATIENT
Start: 2024-10-28

## 2024-12-01 DIAGNOSIS — D50.9 IRON DEFICIENCY ANEMIA, UNSPECIFIED IRON DEFICIENCY ANEMIA TYPE: ICD-10-CM

## 2024-12-03 RX ORDER — METOPROLOL SUCCINATE 25 MG/1
25 TABLET, EXTENDED RELEASE ORAL DAILY
Qty: 30 TABLET | Refills: 3 | Status: SHIPPED | OUTPATIENT
Start: 2024-12-03

## (undated) DEVICE — SUT VICRYL 0 UR-6 27 IN J603H

## (undated) DEVICE — SCD SEQUENTIAL COMPRESSION COMFORT SLEEVE MEDIUM KNEE LENGTH: Brand: KENDALL SCD

## (undated) DEVICE — GLOVE INDICATOR PI UNDERGLOVE SZ 8 BLUE

## (undated) DEVICE — CADIERE FORCEPS: Brand: ENDOWRIST

## (undated) DEVICE — SUT VICRYL 2-0 SH 27 IN UNDYED J417H

## (undated) DEVICE — ADHESIVE SKIN HIGH VISCOSITY EXOFIN 1ML

## (undated) DEVICE — TIP COVER ACCESSORY

## (undated) DEVICE — GLOVE SRG BIOGEL 6.5

## (undated) DEVICE — PMI DISPOSABLE PUNCTURE CLOSURE DEVICE / SUTURE GRASPER: Brand: PMI

## (undated) DEVICE — DRAPE SHEET X-LG

## (undated) DEVICE — NEEDLE HYPO 22G X 1-1/2 IN

## (undated) DEVICE — PROGRASP FORCEPS: Brand: ENDOWRIST

## (undated) DEVICE — DRAPE EQUIPMENT RF WAND

## (undated) DEVICE — GLOVE SRG BIOGEL ECLIPSE 7.5

## (undated) DEVICE — MONOPOLAR CURVED SCISSORS: Brand: ENDOWRIST

## (undated) DEVICE — MAYO STAND COVER: Brand: CONVERTORS

## (undated) DEVICE — MEGA SUTURECUT ND: Brand: ENDOWRIST

## (undated) DEVICE — SUT MONOCRYL 4-0 PS-2 27 IN Y426H

## (undated) DEVICE — LUBRICANT INST ELECTROLUBE ANTISTK WO PAD

## (undated) DEVICE — ARM DRAPE

## (undated) DEVICE — SUT VLOC 90 3-0 V-20 9IN VLOCM0644

## (undated) DEVICE — COLUMN DRAPE

## (undated) DEVICE — INTENDED FOR TISSUE SEPARATION, AND OTHER PROCEDURES THAT REQUIRE A SHARP SURGICAL BLADE TO PUNCTURE OR CUT.: Brand: BARD-PARKER SAFETY BLADES SIZE 11, STERILE

## (undated) DEVICE — ALLENTOWN LAP CHOLE APP PACK: Brand: CARDINAL HEALTH

## (undated) DEVICE — CHLORAPREP HI-LITE 26ML ORANGE

## (undated) DEVICE — ASTOUND STANDARD SURGICAL GOWN, XL: Brand: CONVERTORS

## (undated) DEVICE — BLADELESS OBTURATOR: Brand: WECK VISTA

## (undated) DEVICE — PENCIL ELECTROSURG E-Z CLEAN -0035H

## (undated) DEVICE — CANNULA SEAL

## (undated) DEVICE — [HIGH FLOW INSUFFLATOR,  DO NOT USE IF PACKAGE IS DAMAGED,  KEEP DRY,  KEEP AWAY FROM SUNLIGHT,  PROTECT FROM HEAT AND RADIOACTIVE SOURCES.]: Brand: PNEUMOSURE

## (undated) DEVICE — HARMONIC ACE +7 LAPAROSCOPIC SHEARS ADVANCED HEMOSTASIS 5MM DIAMETER 36CM SHAFT LENGTH  FOR USE WITH GRAY HAND PIECE ONLY: Brand: HARMONIC ACE